# Patient Record
Sex: FEMALE | Race: WHITE | NOT HISPANIC OR LATINO | Employment: UNEMPLOYED | URBAN - NONMETROPOLITAN AREA
[De-identification: names, ages, dates, MRNs, and addresses within clinical notes are randomized per-mention and may not be internally consistent; named-entity substitution may affect disease eponyms.]

---

## 2023-05-04 ENCOUNTER — OFFICE VISIT (OUTPATIENT)
Dept: PRIMARY CARE | Facility: CLINIC | Age: 28
End: 2023-05-04
Payer: COMMERCIAL

## 2023-05-04 VITALS
WEIGHT: 175 LBS | SYSTOLIC BLOOD PRESSURE: 118 MMHG | DIASTOLIC BLOOD PRESSURE: 76 MMHG | OXYGEN SATURATION: 99 % | HEART RATE: 80 BPM | BODY MASS INDEX: 31.01 KG/M2 | HEIGHT: 63 IN

## 2023-05-04 DIAGNOSIS — M79.7 FIBROMYALGIA: Primary | ICD-10-CM

## 2023-05-04 PROCEDURE — 99213 OFFICE O/P EST LOW 20 MIN: CPT | Performed by: FAMILY MEDICINE

## 2023-05-04 PROCEDURE — 1036F TOBACCO NON-USER: CPT | Performed by: FAMILY MEDICINE

## 2023-05-04 RX ORDER — CHOLECALCIFEROL (VITAMIN D3) 125 MCG
125 CAPSULE ORAL DAILY
COMMUNITY
End: 2023-12-11 | Stop reason: WASHOUT

## 2023-05-04 RX ORDER — RIBOFLAVIN (VITAMIN B2) 50 MG
1 TABLET ORAL DAILY
COMMUNITY
End: 2023-12-11 | Stop reason: WASHOUT

## 2023-05-04 RX ORDER — FERROUS SULFATE 325(65) MG
65 TABLET ORAL
COMMUNITY
End: 2023-12-11 | Stop reason: WASHOUT

## 2023-05-04 RX ORDER — VITAMIN E MIXED 400 UNIT
400 CAPSULE ORAL DAILY
COMMUNITY
End: 2023-12-11 | Stop reason: WASHOUT

## 2023-05-04 RX ORDER — ZINC GLUCONATE 50 MG
1 TABLET ORAL DAILY
COMMUNITY
End: 2023-12-11 | Stop reason: WASHOUT

## 2023-05-04 RX ORDER — ASCORBIC ACID 500 MG
500 TABLET ORAL DAILY
COMMUNITY
End: 2023-12-11 | Stop reason: WASHOUT

## 2023-05-04 ASSESSMENT — PATIENT HEALTH QUESTIONNAIRE - PHQ9
SUM OF ALL RESPONSES TO PHQ9 QUESTIONS 1 AND 2: 0
1. LITTLE INTEREST OR PLEASURE IN DOING THINGS: NOT AT ALL
2. FEELING DOWN, DEPRESSED OR HOPELESS: NOT AT ALL

## 2023-05-04 NOTE — PROGRESS NOTES
"Subjective   Patient ID: Janna Alvarado is a 27 y.o. female who presents for Fibromyalgia.    HPI 10 yrs +  Dx Fibromyalgia   Patient was diagnosed [facility] years ago  She does have pain in multiple areas/trigger points  Longstanding fatigue  She does feel less depressed than she did but had depression in the past  Test have been negative for other forms of inflammatory arthritis such as rheumatoid arthritis she stated she is uncertain whether she was ruled out for lupus  The past few months she has had a malar rash  She has had 2 children without miscarriages and full-term  No problems with blood pressure during pregnancy    Review of Systems    Objective   /76   Pulse 80   Ht 1.588 m (5' 2.5\")   Wt 79.4 kg (175 lb)   SpO2 99%   BMI 31.50 kg/m²     Physical Exam  General: alert, no apparent distress, good hygiene   HEAD:  Normocephalic, atraumatic    EARS:  EAC patent, TMs normal,   EYES:  sclera white, DM, conjunctiva noninjected  NOSE: Nasal passages patent   MOUTH: Pharynx clear, tongue uvula midline, grade 2 airway  Neck:  supple, no masses, thyroid non enlarged non nodular, no cervical adenopathy,  Lungs:  no wheezing, no rales , no rhonchi, normal respiratory pattern, breath sounds not diminished  Heart:  regular rate and  rhythm, no murmur, no ectopy, no S3 or S4, no carotid bruits  Abdomen:  soft NT,BS + ,  no organomegaly, no masses, no bruits  Extremities:  no edema, no cyanosis, no clubbing,  2+ posterior tibialis pulse    Psych:  speech fluent, normal affect, normal thought process  Skin:  no rashes, no concerning skin lesions, normal texture  Neuro:  normal gait      Assessment/Plan   Problem List Items Addressed This Visit       Fibromyalgia - Primary        Patient ID: Janna Alvarado is a 27 y.o. female.  Patient completed order form for FM/a test she is aware to send off test may cost over thousand dollars   Offered patient testing for lupus as she does have malar rash " although felt unlikely since she had normal pregnancies no miscarriages denies photosensitivity of the sun.  Procedures

## 2023-05-05 PROBLEM — M79.7 FIBROMYALGIA: Status: ACTIVE | Noted: 2023-05-05

## 2023-05-15 ENCOUNTER — TELEPHONE (OUTPATIENT)
Dept: PRIMARY CARE | Facility: CLINIC | Age: 28
End: 2023-05-15
Payer: COMMERCIAL

## 2023-05-18 NOTE — TELEPHONE ENCOUNTER
Pt called about the testing. I did tell her that we are still working on finding that out. She said that is fine. Her call back number is [phone].

## 2023-05-30 ENCOUNTER — TELEPHONE (OUTPATIENT)
Dept: PRIMARY CARE | Facility: CLINIC | Age: 28
End: 2023-05-30
Payer: COMMERCIAL

## 2023-12-11 ENCOUNTER — OFFICE VISIT (OUTPATIENT)
Dept: PRIMARY CARE | Facility: CLINIC | Age: 28
End: 2023-12-11
Payer: COMMERCIAL

## 2023-12-11 VITALS
HEIGHT: 63 IN | DIASTOLIC BLOOD PRESSURE: 68 MMHG | WEIGHT: 172 LBS | BODY MASS INDEX: 30.48 KG/M2 | HEART RATE: 68 BPM | OXYGEN SATURATION: 99 % | SYSTOLIC BLOOD PRESSURE: 112 MMHG

## 2023-12-11 DIAGNOSIS — M79.7 FIBROMYALGIA: Primary | ICD-10-CM

## 2023-12-11 LAB
ALBUMIN SERPL BCP-MCNC: 4.4 G/DL (ref 3.4–5)
ALP SERPL-CCNC: 47 U/L (ref 33–110)
ALT SERPL W P-5'-P-CCNC: 9 U/L (ref 7–45)
ANION GAP SERPL CALC-SCNC: 14 MMOL/L (ref 10–20)
AST SERPL W P-5'-P-CCNC: 12 U/L (ref 9–39)
BASOPHILS # BLD AUTO: 0.02 X10*3/UL (ref 0–0.1)
BASOPHILS NFR BLD AUTO: 0.4 %
BILIRUB SERPL-MCNC: 0.5 MG/DL (ref 0–1.2)
BUN SERPL-MCNC: 8 MG/DL (ref 6–23)
CALCIUM SERPL-MCNC: 9.4 MG/DL (ref 8.6–10.3)
CHLORIDE SERPL-SCNC: 103 MMOL/L (ref 98–107)
CMV IGG AVIDITY SERPL IA-RTO: NONREACTIVE %
CO2 SERPL-SCNC: 28 MMOL/L (ref 21–32)
CORTIS SERPL-MCNC: 14.2 UG/DL (ref 2.5–20)
CREAT SERPL-MCNC: 0.63 MG/DL (ref 0.5–1.05)
EBV EA IGG SER QL: NEGATIVE
EBV NA AB SER QL: POSITIVE
EBV VCA IGG SER IA-ACNC: POSITIVE
EBV VCA IGM SER IA-ACNC: ABNORMAL
EOSINOPHIL # BLD AUTO: 0.1 X10*3/UL (ref 0–0.7)
EOSINOPHIL NFR BLD AUTO: 1.8 %
ERYTHROCYTE [DISTWIDTH] IN BLOOD BY AUTOMATED COUNT: 11.9 % (ref 11.5–14.5)
ERYTHROCYTE [SEDIMENTATION RATE] IN BLOOD BY WESTERGREN METHOD: 3 MM/H (ref 0–20)
FERRITIN SERPL-MCNC: 146 NG/ML (ref 8–150)
FSH SERPL-ACNC: 8.8 IU/L
GFR SERPL CREATININE-BSD FRML MDRD: >90 ML/MIN/1.73M*2
GLUCOSE SERPL-MCNC: 96 MG/DL (ref 74–99)
HCT VFR BLD AUTO: 42.3 % (ref 36–46)
HGB BLD-MCNC: 14.6 G/DL (ref 12–16)
IMM GRANULOCYTES # BLD AUTO: 0.01 X10*3/UL (ref 0–0.7)
IMM GRANULOCYTES NFR BLD AUTO: 0.2 % (ref 0–0.9)
INSULIN SERPL-ACNC: 6 UIU/ML (ref 3–25)
IRON SATN MFR SERPL: 30 % (ref 25–45)
IRON SERPL-MCNC: 114 UG/DL (ref 35–150)
LH SERPL-ACNC: 6.4 IU/L
LYMPHOCYTES # BLD AUTO: 1.94 X10*3/UL (ref 1.2–4.8)
LYMPHOCYTES NFR BLD AUTO: 35.5 %
MAGNESIUM SERPL-MCNC: 1.87 MG/DL (ref 1.6–2.4)
MCH RBC QN AUTO: 31.8 PG (ref 26–34)
MCHC RBC AUTO-ENTMCNC: 34.5 G/DL (ref 32–36)
MCV RBC AUTO: 92 FL (ref 80–100)
MONOCYTES # BLD AUTO: 0.31 X10*3/UL (ref 0.1–1)
MONOCYTES NFR BLD AUTO: 5.7 %
NEUTROPHILS # BLD AUTO: 3.09 X10*3/UL (ref 1.2–7.7)
NEUTROPHILS NFR BLD AUTO: 56.4 %
NRBC BLD-RTO: 0 /100 WBCS (ref 0–0)
PLATELET # BLD AUTO: 302 X10*3/UL (ref 150–450)
POC HEMOGLOBIN A1C: 5.1 % (ref 4.2–6.5)
POTASSIUM SERPL-SCNC: 4.5 MMOL/L (ref 3.5–5.3)
PROT SERPL-MCNC: 7.1 G/DL (ref 6.4–8.2)
RBC # BLD AUTO: 4.59 X10*6/UL (ref 4–5.2)
SODIUM SERPL-SCNC: 140 MMOL/L (ref 136–145)
T3FREE SERPL-MCNC: 3.7 PG/ML (ref 2.3–4.2)
T4 FREE SERPL-MCNC: 0.95 NG/DL (ref 0.61–1.12)
THYROPEROXIDASE AB SERPL-ACNC: <28 IU/ML
TIBC SERPL-MCNC: 377 UG/DL (ref 240–445)
TSH SERPL-ACNC: 1.87 MIU/L (ref 0.44–3.98)
TTG IGG SER IA-ACNC: <1 U/ML
UIBC SERPL-MCNC: 263 UG/DL (ref 110–370)
VIT B12 SERPL-MCNC: 427 PG/ML (ref 211–911)
WBC # BLD AUTO: 5.5 X10*3/UL (ref 4.4–11.3)

## 2023-12-11 PROCEDURE — 84402 ASSAY OF FREE TESTOSTERONE: CPT

## 2023-12-11 PROCEDURE — 82626 DEHYDROEPIANDROSTERONE: CPT

## 2023-12-11 PROCEDURE — 83036 HEMOGLOBIN GLYCOSYLATED A1C: CPT | Performed by: FAMILY MEDICINE

## 2023-12-11 PROCEDURE — 86665 EPSTEIN-BARR CAPSID VCA: CPT

## 2023-12-11 PROCEDURE — 83525 ASSAY OF INSULIN: CPT

## 2023-12-11 PROCEDURE — 83550 IRON BINDING TEST: CPT

## 2023-12-11 PROCEDURE — 82785 ASSAY OF IGE: CPT

## 2023-12-11 PROCEDURE — 86376 MICROSOMAL ANTIBODY EACH: CPT

## 2023-12-11 PROCEDURE — 86644 CMV ANTIBODY: CPT

## 2023-12-11 PROCEDURE — 84305 ASSAY OF SOMATOMEDIN: CPT

## 2023-12-11 PROCEDURE — 85652 RBC SED RATE AUTOMATED: CPT

## 2023-12-11 PROCEDURE — 36415 COLL VENOUS BLD VENIPUNCTURE: CPT

## 2023-12-11 PROCEDURE — 82533 TOTAL CORTISOL: CPT

## 2023-12-11 PROCEDURE — 99213 OFFICE O/P EST LOW 20 MIN: CPT | Performed by: FAMILY MEDICINE

## 2023-12-11 PROCEDURE — 83540 ASSAY OF IRON: CPT

## 2023-12-11 PROCEDURE — 86790 VIRUS ANTIBODY NOS: CPT

## 2023-12-11 PROCEDURE — 84140 ASSAY OF PREGNENOLONE: CPT

## 2023-12-11 PROCEDURE — 84439 ASSAY OF FREE THYROXINE: CPT

## 2023-12-11 PROCEDURE — 82024 ASSAY OF ACTH: CPT

## 2023-12-11 PROCEDURE — 82607 VITAMIN B-12: CPT

## 2023-12-11 PROCEDURE — 83516 IMMUNOASSAY NONANTIBODY: CPT

## 2023-12-11 PROCEDURE — 80053 COMPREHEN METABOLIC PANEL: CPT

## 2023-12-11 PROCEDURE — 84481 FREE ASSAY (FT-3): CPT

## 2023-12-11 PROCEDURE — 83002 ASSAY OF GONADOTROPIN (LH): CPT

## 2023-12-11 PROCEDURE — 84443 ASSAY THYROID STIM HORMONE: CPT

## 2023-12-11 PROCEDURE — 83001 ASSAY OF GONADOTROPIN (FSH): CPT

## 2023-12-11 PROCEDURE — 85025 COMPLETE CBC W/AUTO DIFF WBC: CPT

## 2023-12-11 PROCEDURE — 84482 T3 REVERSE: CPT

## 2023-12-11 PROCEDURE — 82728 ASSAY OF FERRITIN: CPT

## 2023-12-11 PROCEDURE — 83735 ASSAY OF MAGNESIUM: CPT

## 2023-12-11 PROCEDURE — 86663 EPSTEIN-BARR ANTIBODY: CPT

## 2023-12-11 PROCEDURE — 86664 EPSTEIN-BARR NUCLEAR ANTIGEN: CPT

## 2023-12-11 ASSESSMENT — PATIENT HEALTH QUESTIONNAIRE - PHQ9
1. LITTLE INTEREST OR PLEASURE IN DOING THINGS: NOT AT ALL
SUM OF ALL RESPONSES TO PHQ9 QUESTIONS 1 AND 2: 0
2. FEELING DOWN, DEPRESSED OR HOPELESS: NOT AT ALL

## 2023-12-11 NOTE — PROGRESS NOTES
"Subjective   Patient ID: Janna Alvarado is a 28 y.o. female who presents for Fibromyalgia.    HPI   8 yrs fibro / fatigue  Patient is going through a consult with someone once lab work performed  Provide initiate with extremely extensive lab work  Patient is basically self-pay understands cost  She denies significant change in symptoms  Menstrual periods regular  Has had 2 children  Never had manage Panel    Review of Systems    Objective   /68   Pulse 68   Ht 1.588 m (5' 2.5\")   Wt 78 kg (172 lb)   SpO2 99%   BMI 30.96 kg/m²     Physical Exam  General: alert, no apparent distress, good hygiene   HEAD:  Normocephalic, atraumatic    EARS:  EAC patent, TMs normal,   EYES:  sclera white, DM, conjunctiva noninjected  NOSE: Nasal passages patent   MOUTH: Pharynx clear, tongue uvula midline, grade 2 airway  Neck:  supple, no masses, thyroid non enlarged non nodular, no cervical adenopathy,  Lungs:  no wheezing, no rales , no rhonchi, normal respiratory pattern, breath sounds not diminished  Heart:  regular rate and  rhythm, no murmur, no ectopy, no S3 or S4, no carotid bruits  Abdomen:  soft NT,BS + ,  no organomegaly, no masses, no bruits    Assessment/Plan   Problem List Items Addressed This Visit             ICD-10-CM    Fibromyalgia - Primary M79.7    Relevant Orders    Comprehensive Metabolic Panel (Completed)    Magnesium (Completed)    CBC and Auto Differential (Completed)    Sedimentation Rate (Completed)    Ferritin (Completed)    Iron and TIBC (Completed)    POCT glycosylated hemoglobin (Hb A1C) manually resulted (Completed)    Vitamin B12 (Completed)    Thyroid Stimulating Hormone (Completed)    Thyroxine, Free (Completed)    T3, free (Completed)    T3, Reverse    Pregnenolone    DHEA level    Cortisol (Completed)    ACTH    IgE    Testosterone, total and free    Insulin, random (Completed)    Insulin like growth factor    FSH (Completed)    Luteinizing hormone (Completed)    Thyroid Peroxidase " (TPO) Antibody (Completed)    Tissue Transglutaminase IgG    HHV-6 Antibody,IgG    Cytomegalovirus antibody, IgG (Completed)    Raghavendra-Barr virus VCA antibody panel   If labs unremarkable discussed possibly ordering [facility] CHEM panel for patient.

## 2023-12-12 LAB — IGE SERPL-ACNC: 2 IU/ML (ref 0–214)

## 2023-12-13 LAB — ACTH PLAS-MCNC: 9.8 PG/ML (ref 7.2–63.3)

## 2023-12-14 LAB
EBV VCA IGM SER-ACNC: <10 U/ML (ref 0–43.9)
IGF-I SERPL-MCNC: 150 NG/ML (ref 96–301)
IGF-I Z-SCORE SERPL: -0.7
T3REVERSE SERPL-MCNC: 12.9 NG/DL (ref 9–27)

## 2023-12-16 LAB
TESTOSTERONE FREE (CHAN): 2 PG/ML (ref 0.1–6.4)
TESTOSTERONE,TOTAL,LC-MS/MS: 27 NG/DL (ref 2–45)

## 2023-12-17 LAB — PREG SERPL-MCNC: 137 NG/DL (ref 15–132)

## 2023-12-21 LAB — DHEA SERPL-MCNC: 2.27 NG/ML (ref 1.33–7.78)

## 2023-12-26 LAB — HHV6 IGG TITR SER IF: ABNORMAL {TITER}

## 2025-03-11 ENCOUNTER — APPOINTMENT (OUTPATIENT)
Facility: CLINIC | Age: 30
End: 2025-03-11
Payer: COMMERCIAL

## 2025-03-17 PROBLEM — Z3A.00 WEEKS OF GESTATION OF PREGNANCY NOT SPECIFIED (HHS-HCC): Status: ACTIVE | Noted: 2025-03-17

## 2025-03-19 ENCOUNTER — APPOINTMENT (OUTPATIENT)
Dept: LAB | Facility: HOSPITAL | Age: 30
End: 2025-03-19
Payer: COMMERCIAL

## 2025-03-19 ENCOUNTER — APPOINTMENT (OUTPATIENT)
Facility: CLINIC | Age: 30
End: 2025-03-19
Payer: COMMERCIAL

## 2025-03-19 VITALS — WEIGHT: 206 LBS | BODY MASS INDEX: 37.08 KG/M2 | DIASTOLIC BLOOD PRESSURE: 78 MMHG | SYSTOLIC BLOOD PRESSURE: 120 MMHG

## 2025-03-19 DIAGNOSIS — Z34.92 PRENATAL CARE IN SECOND TRIMESTER (HHS-HCC): ICD-10-CM

## 2025-03-19 DIAGNOSIS — Z3A.21 21 WEEKS GESTATION OF PREGNANCY (HHS-HCC): ICD-10-CM

## 2025-03-19 DIAGNOSIS — M79.7 FIBROMYALGIA: Primary | ICD-10-CM

## 2025-03-19 PROBLEM — O34.219 PREVIOUS CESAREAN SECTION COMPLICATING PREGNANCY (HHS-HCC): Status: ACTIVE | Noted: 2025-03-19

## 2025-03-19 LAB
ABDOMINAL CIRCUMFERENCE: 171.2 MM
ABO GROUP (TYPE) IN BLOOD: NORMAL
BIPARIETAL DIAMETER: 49.2 MM
ERYTHROCYTE [DISTWIDTH] IN BLOOD BY AUTOMATED COUNT: 13.2 % (ref 11.5–14.5)
FEMUR LENGTH: 37.9 MM
HCT VFR BLD AUTO: 39.3 % (ref 36–46)
HEAD CIRCUMFERENCE: 194.6 MM
HGB BLD-MCNC: 13.5 G/DL (ref 12–16)
MCH RBC QN AUTO: 32.3 PG (ref 26–34)
MCHC RBC AUTO-ENTMCNC: 34.4 G/DL (ref 32–36)
MCV RBC AUTO: 94 FL (ref 80–100)
NRBC BLD-RTO: 0 /100 WBCS (ref 0–0)
PLATELET # BLD AUTO: 262 X10*3/UL (ref 150–450)
RBC # BLD AUTO: 4.18 X10*6/UL (ref 4–5.2)
RH FACTOR (ANTIGEN D): NORMAL
WBC # BLD AUTO: 9.2 X10*3/UL (ref 4.4–11.3)

## 2025-03-19 PROCEDURE — 85027 COMPLETE CBC AUTOMATED: CPT

## 2025-03-19 PROCEDURE — 0500F INITIAL PRENATAL CARE VISIT: CPT | Performed by: OBSTETRICS & GYNECOLOGY

## 2025-03-19 PROCEDURE — 76815 OB US LIMITED FETUS(S): CPT | Performed by: OBSTETRICS & GYNECOLOGY

## 2025-03-19 PROCEDURE — 86900 BLOOD TYPING SEROLOGIC ABO: CPT

## 2025-03-19 PROCEDURE — 86901 BLOOD TYPING SEROLOGIC RH(D): CPT

## 2025-03-19 ASSESSMENT — EDINBURGH POSTNATAL DEPRESSION SCALE (EPDS)
I HAVE FELT SCARED OR PANICKY FOR NO GOOD REASON: NO, NOT AT ALL
I HAVE FELT SAD OR MISERABLE: NO, NOT AT ALL
I HAVE BEEN ANXIOUS OR WORRIED FOR NO GOOD REASON: NO, NOT AT ALL
THE THOUGHT OF HARMING MYSELF HAS OCCURRED TO ME: NEVER
TOTAL SCORE: 0
I HAVE BEEN SO UNHAPPY THAT I HAVE BEEN CRYING: NO, NEVER
THINGS HAVE BEEN GETTING ON TOP OF ME: NO, I HAVE BEEN COPING AS WELL AS EVER
I HAVE BEEN ABLE TO LAUGH AND SEE THE FUNNY SIDE OF THINGS: AS MUCH AS I ALWAYS COULD
I HAVE LOOKED FORWARD WITH ENJOYMENT TO THINGS: AS MUCH AS I EVER DID
I HAVE BEEN SO UNHAPPY THAT I HAVE HAD DIFFICULTY SLEEPING: NOT AT ALL
I HAVE BLAMED MYSELF UNNECESSARILY WHEN THINGS WENT WRONG: NO, NEVER

## 2025-03-19 ASSESSMENT — COLUMBIA-SUICIDE SEVERITY RATING SCALE - C-SSRS
1. IN THE PAST MONTH, HAVE YOU WISHED YOU WERE DEAD OR WISHED YOU COULD GO TO SLEEP AND NOT WAKE UP?: NO
6. HAVE YOU EVER DONE ANYTHING, STARTED TO DO ANYTHING, OR PREPARED TO DO ANYTHING TO END YOUR LIFE?: NO
2. HAVE YOU ACTUALLY HAD ANY THOUGHTS OF KILLING YOURSELF?: NO

## 2025-03-19 NOTE — PROGRESS NOTES
Healthy, no meds  Late presentation to care-21w  6yo boy (LTCS, fetal intolerance), 2 yo girl ()  No prior GDM, HDP, or PPH  LMP certain 10/19/2024  BASA  Rh neg      Subjective   Janna Alvarado is a 29 y.o.  at Unknown with a working estimated date of delivery of Not found. who presents for an initial prenatal visit. This pregnancy is planned.    Patient currently experiencing: nausea, declines anti-emetics  Bleeding or cramping since LMP: no  Taking prenatal vitamin: Yes  Ultrasound completed this pregnancy: No    Last pap: due, will get at [facility]    OB History    Para Term  AB Living   3 2 2     2   SAB IAB Ectopic Multiple Live Births           2      # Outcome Date GA Lbr Braxton/2nd Weight Sex Type Anes PTL Lv   3 Current            2 Term 22 40w0d  3.912 kg F  EPI  EMILY   1 Term 20 39w0d  3.033 kg M CS-LTranv   EMILY      Complications: Fetal Intolerance     [place]  Depression Scale Total: 0  Prior pregnancy complications:  FETAL INTOLERANCE TO LABOR    History of hypertension:  No    History reviewed. No pertinent past medical history.   Past Surgical History:   Procedure Laterality Date    APPENDECTOMY       SECTION, LOW TRANSVERSE        Social History     Socioeconomic History    Marital status:      Spouse name: Poli    Number of children: 2   Tobacco Use    Smoking status: Former     Types: Cigarettes    Smokeless tobacco: Never   Vaping Use    Vaping status: Former    Substances: Flavoring    Devices: Disposable   Substance and Sexual Activity    Alcohol use: Never    Drug use: Never    Sexual activity: Yes     Partners: Male     Birth control/protection: None        Objective   Weight: 93.4 kg (206 lb)  Expected Total Weight Gain: 5 kg (11 lb)-9 kg (19 lb)   Pregravid BMI: 32.38  BP: 120/78  Fetal Heart Rate: 154 Fundal Height (cm): 22 cm Presentation: Breech           ROS  Constitutional: Negative.    HENT: Negative.     Eyes: Negative.     Respiratory: Negative.     Cardiovascular: Negative.    Gastrointestinal: Negative.    Endocrine: Negative.    Genitourinary: Negative.    Musculoskeletal: Negative.    Skin: Negative.    Allergic/Immunologic: Negative.    Neurological: Negative.    Hematological: Negative.    Psychiatric/Behavioral: Negative.         Physical Exam  Constitutional:       General: She is not in acute distress.     Appearance: Normal appearance.   Pulmonary:      Effort: Pulmonary effort is normal.   Abdominal:      General: Bowel sounds are normal.      Palpations: Abdomen is soft.   Musculoskeletal:         General: Normal range of motion.   Neurological:      Mental Status: She is alert.   Psychiatric:         Mood and Affect: Mood normal.   Pelvic:   Normal external genitalia, no lesions.  Normal vaginal discharge.  Cervix closed with normal cervical length. Uterus 22 wk size,     ULTRASOUND:  single viable intrauterine pregnancy with noted cardiac activity and FHR of 154  Comp. US GS: 21w6d  EDC:2025  Consistent with LMP dating    Postpartum Depression: Low Risk  (3/19/2025)    [place]  Depression Scale     Last EPDS Total Score: 0     Last EPDS Self Harm Result: Never          Assessment   Janna Alvarado is a 29 y.o.  at Unknown with a working estimated date of delivery of Not found. who presents for an initial prenatal visit.   She was oriented to the practice and on call coverage system.  Healthy diet, exercise and weight gain expectations in pregnancy were reviewed.  She was counseled regarding unsafe exposures.  She was counseled accordingly for any risk factors identified.    Problem List Items Addressed This Visit             ICD-10-CM    Fibromyalgia - Primary M79.7    21 weeks gestation of pregnancy ([facility]) Z3A.21     Other Visit Diagnoses         Codes    Prenatal care in second trimester ([facility])     Z34.92    Relevant Orders    C. trachomatis / N. gonorrhoeae, Amplified, Urogenital     Urine Culture    Abo/Rh (Completed)    CBC Anemia Panel With Reflex,Pregnancy    Hemoglobin A1C    Hepatitis B Surface Antigen    Syphilis Screen with Reflex    Rubella Antibody, IgG    HIV 1/2 Antigen/Antibody Screen with Reflex to Confirmation            Plan   - New OB resources provided and reviewed with particular attention to dietary, travel, and medication restrictions  - Oriented to practice, CNM vs. MD care  - Reviewed bleeding precautions, warning signs, when to call provider; phone number provided  - Routine NOB labs ordered  - Return in 4 weeks for routine prenatal care  Daniel Henderson MD    Education provided:   Avoidance of alcohol, tobacco and drug use     Dietary restrictions reviewed including avoiding raw or poorly cooked meat, lunch meat and soft cheeses    3.    Adequate water intake.  Avoid empty calories with juices    4.    Recommendation for weight gain are based on initial BMI (body mass index) but hope is for 25 lbs or less.    5.    Limit caffeine to less than 200-300 mg/day    6.    Take folic acid 400 mcg daily.  Incorporate 5,000u Vitamin D3 per day.  Magnesium supplementation with Ultra Mag           is great for headaches, muscle aches, constipation and sleep    7.    Importance of good sleep hygiene and avoidance of laying on back after 15 weeks    8.    Encourage daily physical activity of 30 minutes a day the majority of the days of the week.  There is a great program         for pregnant and postpartum mom's called [id number]    9.    Discussed normal physiologic changes:  Round ligament pain, nausea, breast tenderness    10.  Discussed natural remedies, vitamins and prescription medications for nausea    11.  Baby aspirin 162mg daily (two baby aspirin) for the reduction of pre-eclampsia during pregnancy.  This is now recommended for all pregnancies because outcomes have noted to be better. Even if you have never had any blood pressure issues, you can develop hypertension  during your pregnancy.  This has been well studied and safe to take starting at 10 weeks gestation until after the birth of your baby.    **IF AT ANYTIME DURING YOUR PREGNANCY YOU HAVE CONCERNS THAT YOU CANNOT AFFORD HEALTHY FOOD PLEASE LET US KNOW!**  We have a Food for Life program and would be happy to place a referral for you.  It is so important to eat healthy during your pregnancy and we treat food as medicine.  Healthy food is expensive!  This program will allow you and your family up to 4 to receive food and recipes for one week per month.  This needs to be renewed every 6 months.    Ultrasound and screening for aneuploidies (Down Syndrome/Trisomy 21, Trisomy 13 + 18)  There are two ultrasounds that are recommended during your pregnancy.  A nuchal translucency is done between 11-13 weeks and is checking for any structural defects that are obvious at this early gestation.  An anatomy scan will be done at approximately 19-20 weeks to look at all structures.  An order has been placed in [facility] to get these scheduled.    Based on risk factors and any concerns the maternal fetal medicine provider has, you may need a repeat ultrasound.  Healthy pregnancies that do not have any other concerns by the midwife or maternal fetal medicine do not have any repeat ultrasounds done.    Cell free DNA is a test that can be done at 10 weeks by a blood sample taken from you that can assess the baby to be low or high risk for trisomy 21 (Down Syndrome), and trisomy 13 + 18.  This test will also know the fetal sex only if you want to know.    Labs:   An order will be placed for your new ob labs.  Please get those done at the time of your ultrasound.  They will collect multiple tubes of blood for new ob labs and also urine for STI testing          and a urine culture.   If there are any concerns with any blood work or urine testing WE WILL CALL YOU OR COMMUNICATE VIA Melon #[email or web address]!!!   The biggest concerns our patients have is when they  see their complete blood count.  The reference range in the result is for a non pregnant person!  We will notify you if there is any need to start an iron supplement.  3.    At 26-28 weeks a glucose test is ordered to see if you have gestational diabetes.  We also reassess if you have anemia, which can be common in pregnancy  4.    Group B strep culture will be done at 36 weeks gestation.      How frequently will you come for your prenatal appointments?  We will see you in the office every 4 weeks until you are 30 weeks, then every 2 weeks until 36 weeks and then weekly until your baby is born.    To call for questions regarding your care of if you are in labor is [phone]  After hours, the answering service will ask you if you are in labor or if this is an emergency that can not wait until the office is open to be connected to a representative that will take your message and forward it to the provider that is on call.    [email or web address] Messages     If you would like to tour [facility]'s Maternity [place]:  Please call the Childbirth education line at [phone]    Danger signs to report:  Seek medical care immediately if you have pain that is doubling you over or vaginal bleeding that is heavier than a  period  Notify the office should you have any burning, urgency, frequency of urination or other concerning symptoms.    Medications that are safe for common discomforts of pregnancy:   Tylenol   Tums or Papaya extract for any upset stomach or heartburn   Zyrtec, Claritin, Benadryl for allergy symptoms   Sudafed or Robitussin for cold symptoms  [employer] is an jan that is great for what medications are safe to take throughout your pregnancy and breastfeeding journey through the first year of life!  Well worth the $3.99    Work restrictions:  A normal healthy pregnancy without any complications are able to have the standard pregnancy work restrictions which is no pushing/pulling/lifting greater than 25 pounds  "independently    FMLA paperwork  Can be brought to the office for us to fill out for when you are starting your maternity leave (either your scheduled date of going to the hospital or your due date).  We cannot give out early FMLA when there is no documented medical conditions that are considered \"normal pregnancy\" events.    Comfort measures   Chiropractors that are [employer] Certified are great for alleviation of ligament pain   Yoga is good for your ligaments and mentally preparing for baby and labor.  A prenatal yoga class is recommended.  3.     Prenatal massages are fine  4.     A maternity support belt is an amazing thing that can help ligament pain -- can be purchased on Amazon  5.     Good supportive shoes are key to helping with ligament pain    Dental care  It is very important to see a dentist during your pregnancy for routine cleaning and also if you develop any dental pain during your pregnancy.  Healthy gums and teeth are very important during your pregnancy.  We can provide you with a dental letter if your dentist would like one.    Thank you for choosing our practice and [facility] for you healthcare!  I am excited to partner with you during this very special time!     If there is anything I can do to help make your experience positive, please come to your visits with questions and concerns and do not be afraid to ask what is on your mind!    Until then, be well!                                                                                                         [facility]  [facility]  [address] [place]  [address] [phone]    [facility] [place] [address]  [place]  [address] [phone]    [facility]  [address]  [address]  [place] [address] [phone]    [facility]  [address]  [address]  [place], [place]  " [address]  [phone]    [facility]  [address] [place] [address]  [place] [id number]  [phone]    [facility]  [address]  [place], [place]  [id number] [phone]

## 2025-03-20 LAB — REFLEX ADDED, ANEMIA PANEL: NORMAL

## 2025-03-21 LAB
BACTERIA UR CULT: NORMAL
C TRACH RRNA SPEC QL NAA+PROBE: NOT DETECTED
N GONORRHOEA RRNA SPEC QL NAA+PROBE: NOT DETECTED
QUEST GC CT AMPLIFIED (ALWAYS MESSAGE): NORMAL

## 2025-03-23 LAB
EST. AVERAGE GLUCOSE BLD GHB EST-MCNC: 94 MG/DL
EST. AVERAGE GLUCOSE BLD GHB EST-SCNC: 5.2 MMOL/L
HBA1C MFR BLD: 4.9 % OF TOTAL HGB
HBV SURFACE AG SERPL QL IA: NORMAL
HIV 1+2 AB+HIV1 P24 AG SERPL QL IA: NORMAL
RUBV IGG SERPL IA-ACNC: 4.16 INDEX
T PALLIDUM AB SER QL IA: NEGATIVE

## 2025-04-15 PROBLEM — Z3A.25 25 WEEKS GESTATION OF PREGNANCY (HHS-HCC): Status: ACTIVE | Noted: 2025-03-17

## 2025-04-16 ENCOUNTER — APPOINTMENT (OUTPATIENT)
Facility: CLINIC | Age: 30
End: 2025-04-16
Payer: COMMERCIAL

## 2025-04-16 VITALS — DIASTOLIC BLOOD PRESSURE: 72 MMHG | WEIGHT: 215 LBS | SYSTOLIC BLOOD PRESSURE: 116 MMHG | BODY MASS INDEX: 38.7 KG/M2

## 2025-04-16 DIAGNOSIS — Z3A.25 25 WEEKS GESTATION OF PREGNANCY (HHS-HCC): Primary | ICD-10-CM

## 2025-04-16 DIAGNOSIS — M79.7 FIBROMYALGIA: ICD-10-CM

## 2025-04-16 DIAGNOSIS — O34.219 PREVIOUS CESAREAN SECTION COMPLICATING PREGNANCY (HHS-HCC): ICD-10-CM

## 2025-04-16 PROCEDURE — 0501F PRENATAL FLOW SHEET: CPT | Performed by: OBSTETRICS & GYNECOLOGY

## 2025-04-16 NOTE — PROGRESS NOTES
Ob Visit  25     Objective   Physical Exam:   Weight: 97.5 kg (215 lb)  Pregravid BMI: 32.38  BP: 116/72  Fetal Heart Rate: 140 Fundal Height (cm): 26 cm Presentation: Vertex           ASSESSMENT   Janna Alvarado is a 29 y.o.  at 25w3d with a working estimated date of delivery of 2025, by Last Menstrual Period who presents for a routine prenatal visit.    The following concerns we addressed today:  DISCUSSED TDAP  BW REVIEWED    PLAN  TDAP declined  Gtt next visit  RHOGAM NEXT VISIT  -RTC in 4 weeks    Problem List Items Addressed This Visit       Fibromyalgia    25 weeks gestation of pregnancy ([facility]) - Primary    Overview   Healthy, no meds  6yo boy (LTCS, fetal intolerance), 2 yo girl ()  No prior GDM, HDP, or PPH  LMP certain 10/19/2024  Late presentation to care, 21 wks.  Declines level 2 and genetic screening      Desired provider in labor: [] CNM  [] Physician   [x] Either Acceptable  [x] Blood Products: [x] Yes, accepts [] No, needs counseling  [x] Initial BMI: Could not be calculated   [x] Prenatal Labs: [x]ORDERED  [x]REVIEWED  [x] Cervical Cancer Screening up to date due, late presentation to care, will get PP  [x] Rh status: O NEG  [x] Screen for IPV and Substance Use Risk: FORMER tobacco  [x] Genetic Screening (cfDNA):  DECLINED  [x] First Trimester Anatomy Screen (11-13.6 wks): DECLINED  [x] Baby ASA (initiated): DISCUSSED  [x] Pregnancy dated by:  LMP AND 21 WK US    [x] Anatomy US: (19-20 wks) DECLINED, Level 1: ALIZA, anterior placenta, no previa, 3V, Girl  [x] Federal Sterilization consent signed (if indicated): N/A  [x] 1hr GCT at 24-28wks:  [x] Rhogam (if indicated): NEEDED,   [] Fetal Surveillance (if indicated):  [] Tdap (27-32 wks, may be given up to 36 wks if initial window missed):   [] RSV (32-36 wks) (Sept. to end of ):     [] Feeding Intentions:  [] Postpartum Birth control method:   [] GBS at 36 - 37 wks:  [] 39 weeks discussion of IOL vs. Expectant  management:  [x] Mode of delivery ( anticipated ): TOLAC 88.3% MFMU score           Relevant Orders    CBC Anemia Panel With Reflex,Pregnancy    Glucose, 1 Hour Screen, Pregnancy    Type And Screen Is this order related to pregnancy or an upcoming surgery? No    Previous  section complicating pregnancy ([facility])    Overview   LTCS for fetal intolerance in labor   x 1  MFMU 83%             Daniel Henderson MD

## 2025-05-19 PROBLEM — O26.899 RH NEGATIVE STATE IN ANTEPARTUM PERIOD (HHS-HCC): Status: ACTIVE | Noted: 2025-05-19

## 2025-05-19 PROBLEM — Z67.91 RH NEGATIVE STATE IN ANTEPARTUM PERIOD (HHS-HCC): Status: ACTIVE | Noted: 2025-05-19

## 2025-05-19 PROBLEM — O09.33 INSUFFICIENT PRENATAL CARE IN THIRD TRIMESTER (HHS-HCC): Status: ACTIVE | Noted: 2025-05-19

## 2025-05-19 PROBLEM — Z3A.30 30 WEEKS GESTATION OF PREGNANCY (HHS-HCC): Status: ACTIVE | Noted: 2025-03-17

## 2025-05-19 NOTE — PROGRESS NOTES
Ob Visit  25     Objective   Physical Exam:   Weight: 98.9 kg (218 lb)  Pregravid BMI: 32.38  BP: 126/80  Fetal Heart Rate: 140 Fundal Height (cm): 31 cm Presentation: Vertex         ASSESSMENT   Janna Alvarado is a 29 y.o.  at 30w2d with a working estimated date of delivery of 2025, by Last Menstrual Period who presents for a routine prenatal visit.    The following concerns we addressed today   -fetus active, denies PTL s/s  PLAN  -rhogam today  -gtt today  -discussed tdap  -RTC in 2 weeks    Problem List Items Addressed This Visit       30 weeks gestation of pregnancy ([facility])    Overview   Healthy, no meds  4yo boy (LTCS, fetal intolerance), 2 yo girl ()  No prior GDM, HDP, or PPH  LMP certain 10/19/2024  Late presentation to care, 21 wks.  Declines level 2 and genetic screening      Desired provider in labor: [] CNM  [] Physician   [x] Either Acceptable  [x] Blood Products: [x] Yes, accepts [] No, needs counseling  [x] Initial BMI: Could not be calculated   [x] Prenatal Labs: [x]ORDERED  [x]REVIEWED  [x] Cervical Cancer Screening up to date due, late presentation to care, will get PP  [x] Rh status: O NEG  [x] Screen for IPV and Substance Use Risk: FORMER tobacco  [x] Genetic Screening (cfDNA):  DECLINED  [x] First Trimester Anatomy Screen (11-13.6 wks): DECLINED  [x] Baby ASA (initiated): DISCUSSED  [x] Pregnancy dated by:  LMP AND 21 WK US    [x] Anatomy US: (19-20 wks) DECLINED, Level 1: ALIZA, anterior placenta, no previa, 3V, Girl  [x] Federal Sterilization consent signed (if indicated): N/A  [x] 1hr GCT at 24-28wks:  [x] Rhogam (if indicated): NEEDED, rhogam 25  [] Fetal Surveillance (if indicated):  [x] Tdap (27-32 wks, may be given up to 36 wks if initial window missed): discussed  [] RSV (32-36 wks) (Sept. to end of ):     [] Feeding Intentions:  [] Postpartum Birth control method:   [] GBS at 36 - 37 wks:  [] 39 weeks discussion of IOL vs. Expectant management:  [x]  Mode of delivery ( anticipated ): TOLAC 88.3% MFMU score           Previous  section complicating pregnancy ([facility]) - Primary    Overview   LTCS for fetal intolerance in labor   x 1  MFMU 83%         Rh negative state in antepartum period ([facility])    Overview   []  Baseline antibody screen  [x]  Second trimester antibody screen  []  Rhogam given at 28 weeks           Relevant Medications    rho(D) immune globulin (Rhogam) injection 300 mcg (Start on 2025  2:00 PM)    Insufficient prenatal care in third trimester ([facility])    Overview   -late presentation to care             Daniel Henderson MD

## 2025-05-20 ENCOUNTER — APPOINTMENT (OUTPATIENT)
Dept: LAB | Facility: HOSPITAL | Age: 30
End: 2025-05-20
Payer: COMMERCIAL

## 2025-05-20 ENCOUNTER — APPOINTMENT (OUTPATIENT)
Facility: CLINIC | Age: 30
End: 2025-05-20
Payer: COMMERCIAL

## 2025-05-20 VITALS — SYSTOLIC BLOOD PRESSURE: 126 MMHG | BODY MASS INDEX: 39.24 KG/M2 | DIASTOLIC BLOOD PRESSURE: 80 MMHG | WEIGHT: 218 LBS

## 2025-05-20 DIAGNOSIS — O34.219 PREVIOUS CESAREAN SECTION COMPLICATING PREGNANCY (HHS-HCC): Primary | ICD-10-CM

## 2025-05-20 DIAGNOSIS — O09.33 INSUFFICIENT PRENATAL CARE IN THIRD TRIMESTER (HHS-HCC): ICD-10-CM

## 2025-05-20 DIAGNOSIS — Z3A.30 30 WEEKS GESTATION OF PREGNANCY (HHS-HCC): ICD-10-CM

## 2025-05-20 DIAGNOSIS — O26.899 RH NEGATIVE STATE IN ANTEPARTUM PERIOD (HHS-HCC): ICD-10-CM

## 2025-05-20 DIAGNOSIS — Z67.91 RH NEGATIVE STATE IN ANTEPARTUM PERIOD (HHS-HCC): ICD-10-CM

## 2025-05-20 LAB
ABO GROUP (TYPE) IN BLOOD: NORMAL
ANTIBODY SCREEN: NORMAL
ERYTHROCYTE [DISTWIDTH] IN BLOOD BY AUTOMATED COUNT: 12.8 % (ref 11.5–14.5)
HCT VFR BLD AUTO: 35.4 % (ref 36–46)
HGB BLD-MCNC: 12.2 G/DL (ref 12–16)
MCH RBC QN AUTO: 32.1 PG (ref 26–34)
MCHC RBC AUTO-ENTMCNC: 34.5 G/DL (ref 32–36)
MCV RBC AUTO: 93 FL (ref 80–100)
NRBC BLD-RTO: 0 /100 WBCS (ref 0–0)
PLATELET # BLD AUTO: 296 X10*3/UL (ref 150–450)
RBC # BLD AUTO: 3.8 X10*6/UL (ref 4–5.2)
RH FACTOR (ANTIGEN D): NORMAL
WBC # BLD AUTO: 9.6 X10*3/UL (ref 4.4–11.3)

## 2025-05-20 PROCEDURE — 86850 RBC ANTIBODY SCREEN: CPT

## 2025-05-20 PROCEDURE — 0501F PRENATAL FLOW SHEET: CPT | Performed by: OBSTETRICS & GYNECOLOGY

## 2025-05-20 PROCEDURE — 86900 BLOOD TYPING SEROLOGIC ABO: CPT

## 2025-05-20 PROCEDURE — 96372 THER/PROPH/DIAG INJ SC/IM: CPT | Performed by: OBSTETRICS & GYNECOLOGY

## 2025-05-20 PROCEDURE — 86901 BLOOD TYPING SEROLOGIC RH(D): CPT

## 2025-05-20 PROCEDURE — 85027 COMPLETE CBC AUTOMATED: CPT

## 2025-05-21 LAB
GLUCOSE 1H P 50 G GLC PO SERPL-MCNC: 188 MG/DL
REFLEX ADDED, ANEMIA PANEL: NORMAL

## 2025-05-23 ENCOUNTER — TELEPHONE (OUTPATIENT)
Facility: CLINIC | Age: 30
End: 2025-05-23
Payer: COMMERCIAL

## 2025-05-23 DIAGNOSIS — R73.09 ELEVATED GLUCOSE TOLERANCE TEST: ICD-10-CM

## 2025-05-23 NOTE — TELEPHONE ENCOUNTER
Called and informed pt of result. Order placed and sent to provider for 3hr.  DANITZA Littlejohn PCNA

## 2025-05-23 NOTE — TELEPHONE ENCOUNTER
----- Message from Daniel Henderson sent at 5/23/2025 12:35 PM EDT -----  Please let pt. Know her 1 hr GTT was elevated and she will need to do a 3 hr GTT  ----- Message -----  From: Lab, Background User  Sent: 5/20/2025   8:29 PM EDT  To: Daniel Henderson MD

## 2025-06-03 LAB
GLUCOSE 1H P CHAL SERPL-MCNC: 190 MG/DL
GLUCOSE 2H P CHAL SERPL-MCNC: 152 MG/DL
GLUCOSE 3H P 100 G GLC PO SERPL-MCNC: 39 MG/DL
GLUCOSE P FAST SERPL-MCNC: 77 MG/DL (ref 65–94)
SERVICE CMNT-IMP: ABNORMAL

## 2025-06-23 ENCOUNTER — PREP FOR PROCEDURE (OUTPATIENT)
Facility: CLINIC | Age: 30
End: 2025-06-23
Payer: COMMERCIAL

## 2025-06-23 PROBLEM — Z3A.35 35 WEEKS GESTATION OF PREGNANCY (HHS-HCC): Status: ACTIVE | Noted: 2025-03-17

## 2025-06-23 NOTE — PROGRESS NOTES
Ob Visit  25     Objective   Physical Exam:   Weight: 103 kg (227 lb)  Pregravid BMI: 32.38  BP: 124/84  Fetal Heart Rate: 144 Fundal Height (cm): 36 cm Presentation: Vertex  Dilation: Closed Effacement (%): 50      ASSESSMENT   Janna Alvarado is a 29 y.o.  at 35w2d with a working estimated date of delivery of 2025, by Last Menstrual Period who presents for a routine prenatal visit.    The following concerns we addressed today:  -reviewed BW, abnormal 1 hr, one elevated value on 3hr, dietary recommendations to increase protein intake  -delivery planning, desires   -PP BC  -Feeding intentions    PLAN  -TOLAC consent signed (MFMU 83%)  -GBS next visit (spont labor closer to 40wks with last)  -Consent signed today  -planning IOL closer to EDC  -RTC in 2 weeks    Problem List Items Addressed This Visit       Fibromyalgia    35 weeks gestation of pregnancy ([facility]) - Primary    Overview   Healthy, no meds  6yo boy (LTCS, fetal intolerance), 2 yo girl ()  No prior GDM, HDP, or PPH  LMP certain 10/19/2024  Late presentation to care, 21 wks.  Declines level 2 and genetic screening      Desired provider in labor: [] CNM  [] Physician   [x] Either Acceptable  [x] Blood Products: [x] Yes, accepts [] No, needs counseling  [x] Initial BMI: Could not be calculated   [x] Prenatal Labs: [x]ORDERED  [x]REVIEWED  [x] Cervical Cancer Screening up to date due, late presentation to care, will get PP  [x] Rh status: O NEG  [x] Screen for IPV and Substance Use Risk: FORMER tobacco  [x] Genetic Screening (cfDNA):  DECLINED  [x] First Trimester Anatomy Screen (11-13.6 wks): DECLINED  [x] Baby ASA (initiated): DISCUSSED  [x] Pregnancy dated by:  LMP AND 21 WK US    [x] Anatomy US: (19-20 wks) DECLINED, Level 1: ALIZA, anterior placenta, no previa, 3V, Girl  [x] Federal Sterilization consent signed (if indicated): N/A  [x] 1hr GCT at 24-28wks: 188, [x] 3hr 1/ abnormal  [x] Rhogam (if indicated): NEEDED, rhogam  25  [] Fetal Surveillance (if indicated):  [x] Tdap (27-32 wks, may be given up to 36 wks if initial window missed): discussed, declined    [x] Feeding Intentions: Bottle  [x] Postpartum Birth control method: considering IUD  [] GBS at 36 - 37 wks:  [x] 39 weeks discussion of IOL vs. Expectant management: Previous spontaneous labor at 39w5d. Planning 40 wk IOL if no spontaneous labor  [x] Mode of delivery ( anticipated ): TOLAC 88.3% MFMU score           Previous  section complicating pregnancy ([facility])    Overview   LTCS for fetal intolerance in labor   x 1  MFMU 83%         Rh negative state in antepartum period ([facility])    Overview   []  Baseline antibody screen  [x]  Second trimester antibody screen  []  Rhogam given at 28 weeks           Insufficient prenatal care in third trimester ([facility])    Overview   -late presentation to care             Daniel Henderson MD

## 2025-06-24 ENCOUNTER — APPOINTMENT (OUTPATIENT)
Facility: CLINIC | Age: 30
End: 2025-06-24
Payer: COMMERCIAL

## 2025-06-24 VITALS — WEIGHT: 227 LBS | DIASTOLIC BLOOD PRESSURE: 84 MMHG | BODY MASS INDEX: 40.86 KG/M2 | SYSTOLIC BLOOD PRESSURE: 124 MMHG

## 2025-06-24 DIAGNOSIS — Z3A.35 35 WEEKS GESTATION OF PREGNANCY (HHS-HCC): Primary | ICD-10-CM

## 2025-06-24 DIAGNOSIS — O09.33 INSUFFICIENT PRENATAL CARE IN THIRD TRIMESTER (HHS-HCC): ICD-10-CM

## 2025-06-24 DIAGNOSIS — O34.219 PREVIOUS CESAREAN SECTION COMPLICATING PREGNANCY (HHS-HCC): ICD-10-CM

## 2025-06-24 DIAGNOSIS — M79.7 FIBROMYALGIA: ICD-10-CM

## 2025-06-24 DIAGNOSIS — O26.899 RH NEGATIVE STATE IN ANTEPARTUM PERIOD (HHS-HCC): ICD-10-CM

## 2025-06-24 DIAGNOSIS — Z67.91 RH NEGATIVE STATE IN ANTEPARTUM PERIOD (HHS-HCC): ICD-10-CM

## 2025-06-24 PROCEDURE — 0501F PRENATAL FLOW SHEET: CPT | Performed by: OBSTETRICS & GYNECOLOGY

## 2025-06-25 ENCOUNTER — PREP FOR PROCEDURE (OUTPATIENT)
Facility: CLINIC | Age: 30
End: 2025-06-25
Payer: COMMERCIAL

## 2025-07-05 PROBLEM — Z3A.37 37 WEEKS GESTATION OF PREGNANCY (HHS-HCC): Status: ACTIVE | Noted: 2025-03-17

## 2025-07-05 NOTE — PROGRESS NOTES
Ob Visit  25     Objective   Physical Exam:   Weight: 105 kg (231 lb)  Pregravid BMI: 32.38  BP: 110/80  Fetal Heart Rate: 146 Fundal Height (cm): 38 cm Presentation: Vertex  Dilation: 2 Effacement (%): 70 Fetal Station: -2    ASSESSMENT   Janna Alvarado is a 29 y.o.  at 37w0d with a working estimated date of delivery of 2025, by Last Menstrual Period who presents for a routine prenatal visit.    The following concerns we addressed today:  Denies VB, LOF, CTX.  Endorses FM    PLAN  -GBS today, consents signed last visit  -IOL scheduled for 25  -RTC in 1 weeks    Problem List Items Addressed This Visit       37 weeks gestation of pregnancy ([facility]) - Primary    Overview   Healthy, no meds  4yo boy (LTCS, fetal intolerance), 2 yo girl ()  No prior GDM, HDP, or PPH  LMP certain 10/19/2024  Late presentation to care, 21 wks.  Declines level 2 and genetic screening      Desired provider in labor: [] CNM  [] Physician   [x] Either Acceptable  [x] Blood Products: [x] Yes, accepts [] No, needs counseling  [x] Initial BMI: Could not be calculated   [x] Prenatal Labs: [x]ORDERED  [x]REVIEWED  [x] Cervical Cancer Screening up to date due, late presentation to care, will get PP  [x] Rh status: O NEG  [x] Screen for IPV and Substance Use Risk: FORMER tobacco  [x] Genetic Screening (cfDNA):  DECLINED  [x] First Trimester Anatomy Screen (11-13.6 wks): DECLINED  [x] Baby ASA (initiated): DISCUSSED  [x] Pregnancy dated by:  LMP AND 21 WK US    [x] Anatomy US: (19-20 wks) DECLINED, Level 1: ALIZA, anterior placenta, no previa, 3V, Girl  [x] Federal Sterilization consent signed (if indicated): N/A  [x] 1hr GCT at 24-28wks: 188, [x] 3hr 1/4 abnormal  [x] Rhogam (if indicated): NEEDED, rhogam 25  [] Fetal Surveillance (if indicated):  [x] Tdap (27-32 wks, may be given up to 36 wks if initial window missed): discussed, declined    [x] Feeding Intentions: Bottle  [x] Postpartum Birth control method:  considering IUD  [] GBS at 36 - 37 wks:  [x] 39 weeks discussion of IOL vs. Expectant management: Previous spontaneous labor at 39w5d. Planning 40 wk IOL if no spontaneous labor  [x] Mode of delivery ( anticipated ): TOLAC 88.3% MFMU score           Relevant Orders    Group B Streptococcus (GBS) Prenatal Screen, Culture    Previous  section complicating pregnancy ([facility])    Overview   LTCS for fetal intolerance in labor   x 1  MFMU 83%         Rh negative state in antepartum period ([facility])    Overview   []  Baseline antibody screen  [x]  Second trimester antibody screen  []  Rhogam given at 28 weeks           Insufficient prenatal care in third trimester ([facility])    Overview   -late presentation to care             Daniel Henderson MD

## 2025-07-08 ENCOUNTER — APPOINTMENT (OUTPATIENT)
Facility: CLINIC | Age: 30
End: 2025-07-08
Payer: COMMERCIAL

## 2025-07-08 VITALS — WEIGHT: 231 LBS | SYSTOLIC BLOOD PRESSURE: 110 MMHG | BODY MASS INDEX: 41.58 KG/M2 | DIASTOLIC BLOOD PRESSURE: 80 MMHG

## 2025-07-08 DIAGNOSIS — O09.33 INSUFFICIENT PRENATAL CARE IN THIRD TRIMESTER (HHS-HCC): ICD-10-CM

## 2025-07-08 DIAGNOSIS — Z3A.37 37 WEEKS GESTATION OF PREGNANCY (HHS-HCC): Primary | ICD-10-CM

## 2025-07-08 DIAGNOSIS — O26.899 RH NEGATIVE STATE IN ANTEPARTUM PERIOD (HHS-HCC): ICD-10-CM

## 2025-07-08 DIAGNOSIS — Z67.91 RH NEGATIVE STATE IN ANTEPARTUM PERIOD (HHS-HCC): ICD-10-CM

## 2025-07-08 DIAGNOSIS — O34.219 PREVIOUS CESAREAN SECTION COMPLICATING PREGNANCY (HHS-HCC): ICD-10-CM

## 2025-07-08 PROCEDURE — 0501F PRENATAL FLOW SHEET: CPT | Performed by: OBSTETRICS & GYNECOLOGY

## 2025-07-08 ASSESSMENT — ENCOUNTER SYMPTOMS
NEUROLOGICAL NEGATIVE: 0
MUSCULOSKELETAL NEGATIVE: 0
RESPIRATORY NEGATIVE: 0
GASTROINTESTINAL NEGATIVE: 0
ENDOCRINE NEGATIVE: 0
CONSTITUTIONAL NEGATIVE: 0
HEMATOLOGIC/LYMPHATIC NEGATIVE: 0
CARDIOVASCULAR NEGATIVE: 0
EYES NEGATIVE: 0
PSYCHIATRIC NEGATIVE: 0
ALLERGIC/IMMUNOLOGIC NEGATIVE: 0

## 2025-07-11 LAB — GP B STREP SPEC QL CULT: NORMAL

## 2025-07-12 PROBLEM — Z3A.38 38 WEEKS GESTATION OF PREGNANCY (HHS-HCC): Status: ACTIVE | Noted: 2025-03-17

## 2025-07-12 NOTE — PROGRESS NOTES
Ob Visit  07/15/25     Objective   Physical Exam:   Weight: 104 kg (230 lb)  Pregravid BMI: 32.38  BP: 112/78  Fetal Heart Rate: 154 Fundal Height (cm): 38 cm Presentation: Vertex  Dilation: 1 Effacement (%): 50 Fetal Station: -2    ASSESSMENT   Janna Alvarado is a 29 y.o.  at 38w0d with a working estimated date of delivery of 2025, by Last Menstrual Period who presents for a routine prenatal visit.    The following concerns we addressed today:  Denies VB, LOF, CTX.  Endorses good FM    PLAN  -has IOL on  if no spontaneous labor prior  -TOLAC  -RTC in 5 weeks for PPV    Problem List Items Addressed This Visit       38 weeks gestation of pregnancy ([facility]) - Primary    Overview   Healthy, no meds  6yo boy (LTCS, fetal intolerance), 4 yo girl ()  No prior GDM, HDP, or PPH  LMP certain 10/19/2024  Late presentation to care, 21 wks.  Declines level 2 and genetic screening      Desired provider in labor: [] CNM  [] Physician   [x] Either Acceptable  [x] Blood Products: [x] Yes, accepts [] No, needs counseling  [x] Initial BMI: Could not be calculated   [x] Prenatal Labs: [x]ORDERED  [x]REVIEWED  [x] Cervical Cancer Screening up to date due, late presentation to care, will get PP  [x] Rh status: O NEG  [x] Screen for IPV and Substance Use Risk: FORMER tobacco  [x] Genetic Screening (cfDNA):  DECLINED  [x] First Trimester Anatomy Screen (11-13.6 wks): DECLINED  [x] Baby ASA (initiated): DISCUSSED  [x] Pregnancy dated by:  LMP AND 21 WK US    [x] Anatomy US: (19-20 wks) DECLINED, Level 1: ALIZA, anterior placenta, no previa, 3V, Girl  [x] Federal Sterilization consent signed (if indicated): N/A  [x] 1hr GCT at 24-28wks: 188, [x] 3hr 1/4 abnormal  [x] Rhogam (if indicated): NEEDED, rhogam 25  [] Fetal Surveillance (if indicated):  [x] Tdap (27-32 wks, may be given up to 36 wks if initial window missed): discussed, declined    [x] Feeding Intentions: Bottle  [x] Postpartum Birth control method:  considering IUD  [x] GBS at 36 - 37 wks: NEG  [x] 39 weeks discussion of IOL vs. Expectant management: Previous spontaneous labor at 39w5d. Planning 40 wk IOL if no spontaneous labor  [x] Mode of delivery ( anticipated ): TOLAC 88.3% MFMU score           Previous  section complicating pregnancy ([facility])    Overview   LTCS for fetal intolerance in labor   x 1  MFMU 83%         Rh negative state in antepartum period ([facility])    Overview   [x]  Baseline antibody screen  [x]  Second trimester antibody screen  [x]  Rhogam given at 28 weeks 25           Insufficient prenatal care in third trimester ([facility])    Overview   -late presentation to care             Daniel Henderson MD

## 2025-07-15 ENCOUNTER — APPOINTMENT (OUTPATIENT)
Facility: CLINIC | Age: 30
End: 2025-07-15
Payer: COMMERCIAL

## 2025-07-15 VITALS — DIASTOLIC BLOOD PRESSURE: 78 MMHG | SYSTOLIC BLOOD PRESSURE: 112 MMHG | WEIGHT: 230 LBS | BODY MASS INDEX: 41.4 KG/M2

## 2025-07-15 DIAGNOSIS — Z67.91 RH NEGATIVE STATE IN ANTEPARTUM PERIOD (HHS-HCC): ICD-10-CM

## 2025-07-15 DIAGNOSIS — Z3A.38 38 WEEKS GESTATION OF PREGNANCY (HHS-HCC): Primary | ICD-10-CM

## 2025-07-15 DIAGNOSIS — O26.899 RH NEGATIVE STATE IN ANTEPARTUM PERIOD (HHS-HCC): ICD-10-CM

## 2025-07-15 DIAGNOSIS — O34.219 PREVIOUS CESAREAN SECTION COMPLICATING PREGNANCY (HHS-HCC): ICD-10-CM

## 2025-07-15 DIAGNOSIS — O09.33 INSUFFICIENT PRENATAL CARE IN THIRD TRIMESTER (HHS-HCC): ICD-10-CM

## 2025-07-15 PROCEDURE — 0501F PRENATAL FLOW SHEET: CPT | Performed by: OBSTETRICS & GYNECOLOGY

## 2025-07-15 ASSESSMENT — EDINBURGH POSTNATAL DEPRESSION SCALE (EPDS)
I HAVE BEEN SO UNHAPPY THAT I HAVE HAD DIFFICULTY SLEEPING: NOT AT ALL
TOTAL SCORE: 0
THE THOUGHT OF HARMING MYSELF HAS OCCURRED TO ME: NEVER
I HAVE FELT SAD OR MISERABLE: NO, NOT AT ALL
THINGS HAVE BEEN GETTING ON TOP OF ME: NO, I HAVE BEEN COPING AS WELL AS EVER
I HAVE FELT SCARED OR PANICKY FOR NO GOOD REASON: NO, NOT AT ALL
I HAVE BEEN ABLE TO LAUGH AND SEE THE FUNNY SIDE OF THINGS: AS MUCH AS I ALWAYS COULD
I HAVE LOOKED FORWARD WITH ENJOYMENT TO THINGS: AS MUCH AS I EVER DID
I HAVE BEEN SO UNHAPPY THAT I HAVE BEEN CRYING: NO, NEVER
I HAVE BEEN ANXIOUS OR WORRIED FOR NO GOOD REASON: NO, NOT AT ALL
I HAVE BLAMED MYSELF UNNECESSARILY WHEN THINGS WENT WRONG: NO, NEVER

## 2025-07-15 ASSESSMENT — ENCOUNTER SYMPTOMS
OCCASIONAL FEELINGS OF UNSTEADINESS: 0
LOSS OF SENSATION IN FEET: 0

## 2025-07-15 ASSESSMENT — LIFESTYLE VARIABLES
HOW MANY STANDARD DRINKS CONTAINING ALCOHOL DO YOU HAVE ON A TYPICAL DAY: PATIENT DOES NOT DRINK
AUDIT-C TOTAL SCORE: 0
HOW OFTEN DO YOU HAVE SIX OR MORE DRINKS ON ONE OCCASION: NEVER
SKIP TO QUESTIONS 9-10: 1
HOW OFTEN DO YOU HAVE A DRINK CONTAINING ALCOHOL: NEVER

## 2025-07-15 ASSESSMENT — PATIENT HEALTH QUESTIONNAIRE - PHQ9
1. LITTLE INTEREST OR PLEASURE IN DOING THINGS: NOT AT ALL
2. FEELING DOWN, DEPRESSED OR HOPELESS: NOT AT ALL
SUM OF ALL RESPONSES TO PHQ9 QUESTIONS 1 & 2: 0

## 2025-07-22 ENCOUNTER — APPOINTMENT (OUTPATIENT)
Facility: CLINIC | Age: 30
End: 2025-07-22
Payer: COMMERCIAL

## 2025-07-23 ENCOUNTER — HOSPITAL ENCOUNTER (INPATIENT)
Facility: HOSPITAL | Age: 30
LOS: 2 days | Discharge: HOME | End: 2025-07-25
Attending: OBSTETRICS & GYNECOLOGY | Admitting: OBSTETRICS & GYNECOLOGY
Payer: COMMERCIAL

## 2025-07-23 ENCOUNTER — APPOINTMENT (OUTPATIENT)
Dept: OBSTETRICS AND GYNECOLOGY | Facility: HOSPITAL | Age: 30
End: 2025-07-23
Payer: COMMERCIAL

## 2025-07-23 DIAGNOSIS — M79.606 TENDERNESS OF LOWER EXTREMITY, UNSPECIFIED LATERALITY: ICD-10-CM

## 2025-07-23 DIAGNOSIS — M79.604 LOWER EXTREMITY PAIN, BILATERAL: Primary | ICD-10-CM

## 2025-07-23 DIAGNOSIS — Z34.90 TERM PREGNANCY (HHS-HCC): ICD-10-CM

## 2025-07-23 DIAGNOSIS — I80.03 THROMBOPHLEBITIS OF SUPERFICIAL VEINS OF BOTH LOWER EXTREMITIES: ICD-10-CM

## 2025-07-23 DIAGNOSIS — M79.605 LOWER EXTREMITY PAIN, BILATERAL: Primary | ICD-10-CM

## 2025-07-23 LAB
ABO GROUP (TYPE) IN BLOOD: NORMAL
ANTIBODY SCREEN: NORMAL
ERYTHROCYTE [DISTWIDTH] IN BLOOD BY AUTOMATED COUNT: 13.9 % (ref 11.5–14.5)
HCT VFR BLD AUTO: 34.7 % (ref 36–46)
HGB BLD-MCNC: 12.3 G/DL (ref 12–16)
MCH RBC QN AUTO: 31.8 PG (ref 26–34)
MCHC RBC AUTO-ENTMCNC: 35.4 G/DL (ref 32–36)
MCV RBC AUTO: 90 FL (ref 80–100)
NRBC BLD-RTO: 0 /100 WBCS (ref 0–0)
PLATELET # BLD AUTO: 245 X10*3/UL (ref 150–450)
RBC # BLD AUTO: 3.87 X10*6/UL (ref 4–5.2)
RH FACTOR (ANTIGEN D): NORMAL
WBC # BLD AUTO: 10.7 X10*3/UL (ref 4.4–11.3)

## 2025-07-23 PROCEDURE — 7210000002 HC LABOR PER HOUR

## 2025-07-23 PROCEDURE — 2500000004 HC RX 250 GENERAL PHARMACY W/ HCPCS (ALT 636 FOR OP/ED): Performed by: OBSTETRICS & GYNECOLOGY

## 2025-07-23 PROCEDURE — 85027 COMPLETE CBC AUTOMATED: CPT | Performed by: OBSTETRICS & GYNECOLOGY

## 2025-07-23 PROCEDURE — 36415 COLL VENOUS BLD VENIPUNCTURE: CPT | Performed by: OBSTETRICS & GYNECOLOGY

## 2025-07-23 PROCEDURE — 86780 TREPONEMA PALLIDUM: CPT | Mod: GEALAB | Performed by: OBSTETRICS & GYNECOLOGY

## 2025-07-23 PROCEDURE — 1120000001 HC OB PRIVATE ROOM DAILY

## 2025-07-23 PROCEDURE — 86900 BLOOD TYPING SEROLOGIC ABO: CPT | Performed by: OBSTETRICS & GYNECOLOGY

## 2025-07-23 RX ORDER — METHYLERGONOVINE MALEATE 0.2 MG/ML
0.2 INJECTION INTRAVENOUS ONCE AS NEEDED
Status: DISCONTINUED | OUTPATIENT
Start: 2025-07-23 | End: 2025-07-24

## 2025-07-23 RX ORDER — OXYTOCIN 10 [USP'U]/ML
10 INJECTION, SOLUTION INTRAMUSCULAR; INTRAVENOUS ONCE AS NEEDED
Status: DISCONTINUED | OUTPATIENT
Start: 2025-07-23 | End: 2025-07-24

## 2025-07-23 RX ORDER — CALCIUM CARBONATE 200(500)MG
1 TABLET,CHEWABLE ORAL EVERY 6 HOURS PRN
Status: DISCONTINUED | OUTPATIENT
Start: 2025-07-23 | End: 2025-07-24

## 2025-07-23 RX ORDER — LOPERAMIDE HYDROCHLORIDE 2 MG/1
4 CAPSULE ORAL EVERY 2 HOUR PRN
Status: DISCONTINUED | OUTPATIENT
Start: 2025-07-23 | End: 2025-07-24

## 2025-07-23 RX ORDER — OXYTOCIN/0.9 % SODIUM CHLORIDE 30/500 ML
2-30 PLASTIC BAG, INJECTION (ML) INTRAVENOUS CONTINUOUS
Status: DISCONTINUED | OUTPATIENT
Start: 2025-07-23 | End: 2025-07-24

## 2025-07-23 RX ORDER — OXYTOCIN/0.9 % SODIUM CHLORIDE 30/500 ML
60 PLASTIC BAG, INJECTION (ML) INTRAVENOUS ONCE AS NEEDED
Status: DISCONTINUED | OUTPATIENT
Start: 2025-07-23 | End: 2025-07-24

## 2025-07-23 RX ORDER — MISOPROSTOL 200 UG/1
800 TABLET ORAL ONCE AS NEEDED
Status: COMPLETED | OUTPATIENT
Start: 2025-07-23 | End: 2025-07-24

## 2025-07-23 RX ORDER — CARBOPROST TROMETHAMINE 250 UG/ML
250 INJECTION, SOLUTION INTRAMUSCULAR ONCE AS NEEDED
Status: DISCONTINUED | OUTPATIENT
Start: 2025-07-23 | End: 2025-07-24

## 2025-07-23 RX ORDER — TERBUTALINE SULFATE 1 MG/ML
0.25 INJECTION SUBCUTANEOUS ONCE AS NEEDED
Status: DISCONTINUED | OUTPATIENT
Start: 2025-07-23 | End: 2025-07-24

## 2025-07-23 RX ORDER — LABETALOL HYDROCHLORIDE 5 MG/ML
20 INJECTION, SOLUTION INTRAVENOUS ONCE AS NEEDED
Status: DISCONTINUED | OUTPATIENT
Start: 2025-07-23 | End: 2025-07-24

## 2025-07-23 RX ORDER — NALBUPHINE HYDROCHLORIDE 10 MG/ML
10 INJECTION INTRAMUSCULAR; INTRAVENOUS; SUBCUTANEOUS
Status: DISCONTINUED | OUTPATIENT
Start: 2025-07-23 | End: 2025-07-24

## 2025-07-23 RX ORDER — OXYTOCIN/0.9 % SODIUM CHLORIDE 30/500 ML
60 PLASTIC BAG, INJECTION (ML) INTRAVENOUS ONCE AS NEEDED
Status: COMPLETED | OUTPATIENT
Start: 2025-07-23 | End: 2025-07-24

## 2025-07-23 RX ORDER — TRANEXAMIC ACID 1 G/10ML
1000 INJECTION, SOLUTION INTRAVENOUS ONCE AS NEEDED
Status: DISCONTINUED | OUTPATIENT
Start: 2025-07-23 | End: 2025-07-24

## 2025-07-23 RX ORDER — ONDANSETRON HYDROCHLORIDE 2 MG/ML
4 INJECTION, SOLUTION INTRAVENOUS EVERY 6 HOURS PRN
Status: DISCONTINUED | OUTPATIENT
Start: 2025-07-23 | End: 2025-07-24

## 2025-07-23 RX ORDER — SODIUM CHLORIDE, SODIUM LACTATE, POTASSIUM CHLORIDE, CALCIUM CHLORIDE 600; 310; 30; 20 MG/100ML; MG/100ML; MG/100ML; MG/100ML
75 INJECTION, SOLUTION INTRAVENOUS CONTINUOUS
Status: DISCONTINUED | OUTPATIENT
Start: 2025-07-23 | End: 2025-07-24

## 2025-07-23 RX ORDER — NIFEDIPINE 10 MG/1
10 CAPSULE ORAL ONCE AS NEEDED
Status: DISCONTINUED | OUTPATIENT
Start: 2025-07-23 | End: 2025-07-24

## 2025-07-23 RX ORDER — LIDOCAINE HYDROCHLORIDE 10 MG/ML
30 INJECTION, SOLUTION INFILTRATION; PERINEURAL ONCE AS NEEDED
Status: DISCONTINUED | OUTPATIENT
Start: 2025-07-23 | End: 2025-07-24

## 2025-07-23 RX ORDER — ONDANSETRON 4 MG/1
4 TABLET, FILM COATED ORAL EVERY 6 HOURS PRN
Status: DISCONTINUED | OUTPATIENT
Start: 2025-07-23 | End: 2025-07-24

## 2025-07-23 RX ORDER — HYDRALAZINE HYDROCHLORIDE 20 MG/ML
5 INJECTION INTRAMUSCULAR; INTRAVENOUS ONCE AS NEEDED
Status: DISCONTINUED | OUTPATIENT
Start: 2025-07-23 | End: 2025-07-24

## 2025-07-23 RX ADMIN — Medication 2 MILLI-UNITS/MIN: at 21:45

## 2025-07-23 RX ADMIN — SODIUM CHLORIDE, SODIUM LACTATE, POTASSIUM CHLORIDE, AND CALCIUM CHLORIDE 75 ML/HR: .6; .31; .03; .02 INJECTION, SOLUTION INTRAVENOUS at 21:45

## 2025-07-23 SDOH — SOCIAL STABILITY: SOCIAL INSECURITY: WITHIN THE LAST YEAR, HAVE YOU BEEN HUMILIATED OR EMOTIONALLY ABUSED IN OTHER WAYS BY YOUR PARTNER OR EX-PARTNER?: NO

## 2025-07-23 SDOH — SOCIAL STABILITY: SOCIAL INSECURITY: WITHIN THE LAST YEAR, HAVE YOU BEEN AFRAID OF YOUR PARTNER OR EX-PARTNER?: NO

## 2025-07-23 SDOH — SOCIAL STABILITY: SOCIAL INSECURITY
WITHIN THE LAST YEAR, HAVE YOU BEEN KICKED, HIT, SLAPPED, OR OTHERWISE PHYSICALLY HURT BY YOUR PARTNER OR EX-PARTNER?: NO

## 2025-07-23 SDOH — HEALTH STABILITY: MENTAL HEALTH: NON-SPECIFIC ACTIVE SUICIDAL THOUGHTS (PAST 1 MONTH): NO

## 2025-07-23 SDOH — SOCIAL STABILITY: SOCIAL INSECURITY: VERBAL ABUSE: DENIES

## 2025-07-23 SDOH — SOCIAL STABILITY: SOCIAL INSECURITY: PHYSICAL ABUSE: DENIES

## 2025-07-23 SDOH — HEALTH STABILITY: MENTAL HEALTH: WISH TO BE DEAD (PAST 1 MONTH): NO

## 2025-07-23 SDOH — ECONOMIC STABILITY: HOUSING INSECURITY: DO YOU FEEL UNSAFE GOING BACK TO THE PLACE WHERE YOU ARE LIVING?: NO

## 2025-07-23 SDOH — ECONOMIC STABILITY: FOOD INSECURITY: WITHIN THE PAST 12 MONTHS, YOU WORRIED THAT YOUR FOOD WOULD RUN OUT BEFORE YOU GOT THE MONEY TO BUY MORE.: NEVER TRUE

## 2025-07-23 SDOH — SOCIAL STABILITY: SOCIAL INSECURITY: DOES ANYONE TRY TO KEEP YOU FROM HAVING/CONTACTING OTHER FRIENDS OR DOING THINGS OUTSIDE YOUR HOME?: NO

## 2025-07-23 SDOH — SOCIAL STABILITY: SOCIAL INSECURITY
WITHIN THE LAST YEAR, HAVE YOU BEEN RAPED OR FORCED TO HAVE ANY KIND OF SEXUAL ACTIVITY BY YOUR PARTNER OR EX-PARTNER?: NO

## 2025-07-23 SDOH — HEALTH STABILITY: MENTAL HEALTH: WERE YOU ABLE TO COMPLETE ALL THE BEHAVIORAL HEALTH SCREENINGS?: YES

## 2025-07-23 SDOH — HEALTH STABILITY: MENTAL HEALTH: SUICIDAL BEHAVIOR (LIFETIME): NO

## 2025-07-23 SDOH — HEALTH STABILITY: MENTAL HEALTH: HAVE YOU USED ANY SUBSTANCES (CANABIS, COCAINE, HEROIN, HALLUCINOGENS, INHALANTS, ETC.) IN THE PAST 12 MONTHS?: NO

## 2025-07-23 SDOH — ECONOMIC STABILITY: FOOD INSECURITY: WITHIN THE PAST 12 MONTHS, THE FOOD YOU BOUGHT JUST DIDN'T LAST AND YOU DIDN'T HAVE MONEY TO GET MORE.: NEVER TRUE

## 2025-07-23 SDOH — SOCIAL STABILITY: SOCIAL INSECURITY: ARE THERE ANY APPARENT SIGNS OF INJURIES/BEHAVIORS THAT COULD BE RELATED TO ABUSE/NEGLECT?: NO

## 2025-07-23 SDOH — SOCIAL STABILITY: SOCIAL INSECURITY: ABUSE SCREEN: ADULT

## 2025-07-23 SDOH — SOCIAL STABILITY: SOCIAL INSECURITY: HAVE YOU HAD THOUGHTS OF HARMING ANYONE ELSE?: NO

## 2025-07-23 SDOH — HEALTH STABILITY: MENTAL HEALTH: HAVE YOU USED ANY PRESCRIPTION DRUGS OTHER THAN PRESCRIBED IN THE PAST 12 MONTHS?: NO

## 2025-07-23 SDOH — SOCIAL STABILITY: SOCIAL INSECURITY: HAVE YOU HAD ANY THOUGHTS OF HARMING ANYONE ELSE?: NO

## 2025-07-23 SDOH — SOCIAL STABILITY: SOCIAL INSECURITY: HAS ANYONE EVER THREATENED TO HURT YOUR FAMILY OR YOUR PETS?: NO

## 2025-07-23 SDOH — SOCIAL STABILITY: SOCIAL INSECURITY: DO YOU FEEL ANYONE HAS EXPLOITED OR TAKEN ADVANTAGE OF YOU FINANCIALLY OR OF YOUR PERSONAL PROPERTY?: NO

## 2025-07-23 SDOH — SOCIAL STABILITY: SOCIAL INSECURITY: ARE YOU OR HAVE YOU BEEN THREATENED OR ABUSED PHYSICALLY, EMOTIONALLY, OR SEXUALLY BY ANYONE?: NO

## 2025-07-23 ASSESSMENT — LIFESTYLE VARIABLES
AUDIT-C TOTAL SCORE: 0
AUDIT-C TOTAL SCORE: 0
HOW OFTEN DO YOU HAVE 6 OR MORE DRINKS ON ONE OCCASION: NEVER
HOW MANY STANDARD DRINKS CONTAINING ALCOHOL DO YOU HAVE ON A TYPICAL DAY: PATIENT DOES NOT DRINK
HOW OFTEN DO YOU HAVE A DRINK CONTAINING ALCOHOL: NEVER
SKIP TO QUESTIONS 9-10: 1

## 2025-07-23 ASSESSMENT — ACTIVITIES OF DAILY LIVING (ADL)
LACK_OF_TRANSPORTATION: NO
LACK_OF_TRANSPORTATION: NO

## 2025-07-23 ASSESSMENT — PATIENT HEALTH QUESTIONNAIRE - PHQ9
SUM OF ALL RESPONSES TO PHQ9 QUESTIONS 1 & 2: 0
1. LITTLE INTEREST OR PLEASURE IN DOING THINGS: NOT AT ALL
2. FEELING DOWN, DEPRESSED OR HOPELESS: NOT AT ALL

## 2025-07-23 ASSESSMENT — PAIN SCALES - GENERAL: PAINLEVEL_OUTOF10: 0 - NO PAIN

## 2025-07-24 ENCOUNTER — ANESTHESIA EVENT (OUTPATIENT)
Dept: OBSTETRICS AND GYNECOLOGY | Facility: HOSPITAL | Age: 30
End: 2025-07-24
Payer: COMMERCIAL

## 2025-07-24 ENCOUNTER — ANESTHESIA (OUTPATIENT)
Dept: OBSTETRICS AND GYNECOLOGY | Facility: HOSPITAL | Age: 30
End: 2025-07-24
Payer: COMMERCIAL

## 2025-07-24 PROBLEM — M79.7 FIBROMYALGIA: Status: RESOLVED | Noted: 2023-05-05 | Resolved: 2025-07-24

## 2025-07-24 PROBLEM — Z3A.38 38 WEEKS GESTATION OF PREGNANCY (HHS-HCC): Status: RESOLVED | Noted: 2025-03-17 | Resolved: 2025-07-24

## 2025-07-24 PROBLEM — O34.219 PREVIOUS CESAREAN SECTION COMPLICATING PREGNANCY (HHS-HCC): Status: RESOLVED | Noted: 2025-03-19 | Resolved: 2025-07-24

## 2025-07-24 PROBLEM — O09.33 INSUFFICIENT PRENATAL CARE IN THIRD TRIMESTER (HHS-HCC): Status: RESOLVED | Noted: 2025-05-19 | Resolved: 2025-07-24

## 2025-07-24 PROBLEM — Z34.90 TERM PREGNANCY (HHS-HCC): Status: RESOLVED | Noted: 2025-07-23 | Resolved: 2025-07-24

## 2025-07-24 PROBLEM — Z67.91 RH NEGATIVE STATE IN ANTEPARTUM PERIOD (HHS-HCC): Status: RESOLVED | Noted: 2025-05-19 | Resolved: 2025-07-24

## 2025-07-24 PROBLEM — O26.899 RH NEGATIVE STATE IN ANTEPARTUM PERIOD (HHS-HCC): Status: RESOLVED | Noted: 2025-05-19 | Resolved: 2025-07-24

## 2025-07-24 LAB
BB ANTIBODY IDENTIFICATION: NORMAL
CASE #: NORMAL
TREPONEMA PALLIDUM IGG+IGM AB [PRESENCE] IN SERUM OR PLASMA BY IMMUNOASSAY: NONREACTIVE

## 2025-07-24 PROCEDURE — 0KQM0ZZ REPAIR PERINEUM MUSCLE, OPEN APPROACH: ICD-10-PCS | Performed by: OBSTETRICS & GYNECOLOGY

## 2025-07-24 PROCEDURE — 2500000001 HC RX 250 WO HCPCS SELF ADMINISTERED DRUGS (ALT 637 FOR MEDICARE OP): Performed by: OBSTETRICS & GYNECOLOGY

## 2025-07-24 PROCEDURE — 2500000004 HC RX 250 GENERAL PHARMACY W/ HCPCS (ALT 636 FOR OP/ED): Performed by: OBSTETRICS & GYNECOLOGY

## 2025-07-24 PROCEDURE — 7100000016 HC LABOR RECOVERY PER HOUR

## 2025-07-24 PROCEDURE — 7210000002 HC LABOR PER HOUR

## 2025-07-24 PROCEDURE — 59409 OBSTETRICAL CARE: CPT | Performed by: OBSTETRICS & GYNECOLOGY

## 2025-07-24 PROCEDURE — 59400 OBSTETRICAL CARE: CPT | Performed by: OBSTETRICS & GYNECOLOGY

## 2025-07-24 PROCEDURE — 51701 INSERT BLADDER CATHETER: CPT

## 2025-07-24 PROCEDURE — 2500000002 HC RX 250 W HCPCS SELF ADMINISTERED DRUGS (ALT 637 FOR MEDICARE OP, ALT 636 FOR OP/ED): Performed by: OBSTETRICS & GYNECOLOGY

## 2025-07-24 PROCEDURE — 2500000004 HC RX 250 GENERAL PHARMACY W/ HCPCS (ALT 636 FOR OP/ED): Performed by: NURSE ANESTHETIST, CERTIFIED REGISTERED

## 2025-07-24 PROCEDURE — 86870 RBC ANTIBODY IDENTIFICATION: CPT

## 2025-07-24 PROCEDURE — 3700000014 EPIDURAL BLOCK: Performed by: NURSE ANESTHETIST, CERTIFIED REGISTERED

## 2025-07-24 PROCEDURE — 1120000001 HC OB PRIVATE ROOM DAILY

## 2025-07-24 PROCEDURE — 59050 FETAL MONITOR W/REPORT: CPT

## 2025-07-24 RX ORDER — ACETAMINOPHEN 325 MG/1
975 TABLET ORAL EVERY 6 HOURS
Status: DISCONTINUED | OUTPATIENT
Start: 2025-07-24 | End: 2025-07-25 | Stop reason: HOSPADM

## 2025-07-24 RX ORDER — FENTANYL/ROPIVACAINE/NS/PF 2MCG/ML-.2
0-25 PLASTIC BAG, INJECTION (ML) INJECTION CONTINUOUS
Refills: 0 | Status: DISCONTINUED | OUTPATIENT
Start: 2025-07-24 | End: 2025-07-24

## 2025-07-24 RX ORDER — ENOXAPARIN SODIUM 100 MG/ML
40 INJECTION SUBCUTANEOUS EVERY 24 HOURS
Status: DISCONTINUED | OUTPATIENT
Start: 2025-07-25 | End: 2025-07-25 | Stop reason: HOSPADM

## 2025-07-24 RX ORDER — OXYTOCIN/0.9 % SODIUM CHLORIDE 30/500 ML
60 PLASTIC BAG, INJECTION (ML) INTRAVENOUS ONCE AS NEEDED
Status: DISCONTINUED | OUTPATIENT
Start: 2025-07-24 | End: 2025-07-25 | Stop reason: HOSPADM

## 2025-07-24 RX ORDER — LIDOCAINE HYDROCHLORIDE AND EPINEPHRINE 15; 5 MG/ML; UG/ML
INJECTION, SOLUTION EPIDURAL AS NEEDED
Status: DISCONTINUED | OUTPATIENT
Start: 2025-07-24 | End: 2025-07-24

## 2025-07-24 RX ORDER — CARBOPROST TROMETHAMINE 250 UG/ML
250 INJECTION, SOLUTION INTRAMUSCULAR ONCE AS NEEDED
Status: DISCONTINUED | OUTPATIENT
Start: 2025-07-24 | End: 2025-07-25 | Stop reason: HOSPADM

## 2025-07-24 RX ORDER — TRANEXAMIC ACID 1 G/10ML
1000 INJECTION, SOLUTION INTRAVENOUS ONCE AS NEEDED
Status: DISCONTINUED | OUTPATIENT
Start: 2025-07-24 | End: 2025-07-25 | Stop reason: HOSPADM

## 2025-07-24 RX ORDER — ADHESIVE BANDAGE
10 BANDAGE TOPICAL
Status: DISCONTINUED | OUTPATIENT
Start: 2025-07-24 | End: 2025-07-25 | Stop reason: HOSPADM

## 2025-07-24 RX ORDER — ONDANSETRON HYDROCHLORIDE 2 MG/ML
4 INJECTION, SOLUTION INTRAVENOUS EVERY 6 HOURS PRN
Status: DISCONTINUED | OUTPATIENT
Start: 2025-07-24 | End: 2025-07-25 | Stop reason: HOSPADM

## 2025-07-24 RX ORDER — LABETALOL HYDROCHLORIDE 5 MG/ML
20 INJECTION, SOLUTION INTRAVENOUS ONCE AS NEEDED
Status: DISCONTINUED | OUTPATIENT
Start: 2025-07-24 | End: 2025-07-25 | Stop reason: HOSPADM

## 2025-07-24 RX ORDER — OXYTOCIN 10 [USP'U]/ML
10 INJECTION, SOLUTION INTRAMUSCULAR; INTRAVENOUS ONCE AS NEEDED
Status: DISCONTINUED | OUTPATIENT
Start: 2025-07-24 | End: 2025-07-25 | Stop reason: HOSPADM

## 2025-07-24 RX ORDER — ONDANSETRON 4 MG/1
4 TABLET, FILM COATED ORAL EVERY 6 HOURS PRN
Status: DISCONTINUED | OUTPATIENT
Start: 2025-07-24 | End: 2025-07-25 | Stop reason: HOSPADM

## 2025-07-24 RX ORDER — DIPHENHYDRAMINE HCL 25 MG
25 CAPSULE ORAL EVERY 6 HOURS PRN
Status: DISCONTINUED | OUTPATIENT
Start: 2025-07-24 | End: 2025-07-25 | Stop reason: HOSPADM

## 2025-07-24 RX ORDER — DIPHENHYDRAMINE HYDROCHLORIDE 50 MG/ML
25 INJECTION, SOLUTION INTRAMUSCULAR; INTRAVENOUS EVERY 6 HOURS PRN
Status: DISCONTINUED | OUTPATIENT
Start: 2025-07-24 | End: 2025-07-25 | Stop reason: HOSPADM

## 2025-07-24 RX ORDER — SIMETHICONE 80 MG
80 TABLET,CHEWABLE ORAL 4 TIMES DAILY PRN
Status: DISCONTINUED | OUTPATIENT
Start: 2025-07-24 | End: 2025-07-25 | Stop reason: HOSPADM

## 2025-07-24 RX ORDER — LOPERAMIDE HYDROCHLORIDE 2 MG/1
4 CAPSULE ORAL EVERY 2 HOUR PRN
Status: DISCONTINUED | OUTPATIENT
Start: 2025-07-24 | End: 2025-07-25 | Stop reason: HOSPADM

## 2025-07-24 RX ORDER — MISOPROSTOL 200 UG/1
800 TABLET ORAL ONCE AS NEEDED
Status: DISCONTINUED | OUTPATIENT
Start: 2025-07-24 | End: 2025-07-25 | Stop reason: HOSPADM

## 2025-07-24 RX ORDER — IBUPROFEN 600 MG/1
600 TABLET, FILM COATED ORAL EVERY 6 HOURS
Status: DISCONTINUED | OUTPATIENT
Start: 2025-07-24 | End: 2025-07-25 | Stop reason: HOSPADM

## 2025-07-24 RX ORDER — HYDRALAZINE HYDROCHLORIDE 20 MG/ML
5 INJECTION INTRAMUSCULAR; INTRAVENOUS ONCE AS NEEDED
Status: DISCONTINUED | OUTPATIENT
Start: 2025-07-24 | End: 2025-07-25 | Stop reason: HOSPADM

## 2025-07-24 RX ORDER — POLYETHYLENE GLYCOL 3350 17 G/17G
17 POWDER, FOR SOLUTION ORAL 2 TIMES DAILY PRN
Status: DISCONTINUED | OUTPATIENT
Start: 2025-07-24 | End: 2025-07-25 | Stop reason: HOSPADM

## 2025-07-24 RX ORDER — METHYLERGONOVINE MALEATE 0.2 MG/ML
0.2 INJECTION INTRAVENOUS ONCE AS NEEDED
Status: DISCONTINUED | OUTPATIENT
Start: 2025-07-24 | End: 2025-07-25 | Stop reason: HOSPADM

## 2025-07-24 RX ADMIN — LIDOCAINE HYDROCHLORIDE AND EPINEPHRINE 3 ML: 15; 5 INJECTION, SOLUTION EPIDURAL at 04:38

## 2025-07-24 RX ADMIN — Medication 3 ML: at 04:41

## 2025-07-24 RX ADMIN — Medication 10 ML/HR: at 04:43

## 2025-07-24 RX ADMIN — MISOPROSTOL 800 MCG: 200 TABLET ORAL at 12:15

## 2025-07-24 RX ADMIN — ACETAMINOPHEN 975 MG: 325 TABLET ORAL at 19:08

## 2025-07-24 RX ADMIN — IBUPROFEN 600 MG: 600 TABLET ORAL at 19:08

## 2025-07-24 RX ADMIN — Medication 3 ML: at 04:40

## 2025-07-24 RX ADMIN — Medication 60 MILLI-UNITS/MIN: at 12:15

## 2025-07-24 ASSESSMENT — PAIN SCALES - GENERAL
PAINLEVEL_OUTOF10: 0 - NO PAIN
PAINLEVEL_OUTOF10: 2
PAIN_LEVEL: 0
PAINLEVEL_OUTOF10: 0 - NO PAIN

## 2025-07-24 ASSESSMENT — PAIN DESCRIPTION - DESCRIPTORS: DESCRIPTORS: ACHING;CRAMPING;SORE

## 2025-07-24 NOTE — CARE PLAN
Problem: Vaginal Birth or  Section  Goal: Fetal and maternal status remain reassuring during the birth process  Outcome: Met  Goal: Prevention of malpresentation/labor dystocia through delivery  Outcome: Met  Goal: Demonstrates labor coping techniques through delivery  Outcome: Met  Goal: Minimal s/sx of HDP and BP<160/110  Outcome: Met  Goal: No s/sx of infection through recovery  Outcome: Met  Goal: No s/sx of hemorrhage through recovery  Outcome: Met     Problem: Postpartum  Goal: Experiences normal postpartum course  Outcome: Progressing  Goal: Appropriate maternal -  bonding  Outcome: Progressing  Goal: Establish and maintain infant feeding pattern for adequate nutrition  Outcome: Progressing  Goal: Incisions, wounds, or drain sites healing without S/S of infection  Outcome: Progressing  Goal: No s/sx infection  Outcome: Progressing  Goal: No s/sx of hemorrhage  Outcome: Progressing  Goal: Minimal s/sx of HDP and BP<160/110  Outcome: Progressing     Problem: Pain - Adult  Goal: Verbalizes/displays adequate comfort level or baseline comfort level  Outcome: Progressing     Problem: Safety - Adult  Goal: Free from fall injury  Outcome: Progressing     Problem: Discharge Planning  Goal: Discharge to home or other facility with appropriate resources  Outcome: Progressing   The patient's goals for the shift include      The clinical goals for the shift include FHR tracing will remain reassuring throughout induction of labor    Goal met.  at 1032. 2nd degree lac with repair. PPH received rectal cytotec and 2nd bag of pitocin.

## 2025-07-24 NOTE — ANESTHESIA PROCEDURE NOTES
Epidural Block    Patient location during procedure: OB  Start time: 7/24/2025 4:28 AM  End time: 7/24/2025 4:32 AM  Reason for block: labor analgesia  Staffing  Performed: CRNA   Authorized by: MAVERICK Lopez    Performed by: MAVERICK Lopez    Preanesthetic Checklist  Completed: patient identified, IV checked, risks and benefits discussed, surgical consent, pre-op evaluation, timeout performed and sterile techniques followed  Block Timeout  RN/Licensed healthcare professional reads aloud to the Anesthesia provider and entire team: Patient identity, procedure with side and site, patient position, and as applicable the availability of implants/special equipment/special requirements.  Patient on coagulant treatment: no  Timeout performed at: 7/24/2025 4:25 AM  Block Placement  Patient position: sitting  Prep: ChloraPrep  Sterility prep: cap, drape, gloves, hand and mask  Sedation level: no sedation  Patient monitoring: blood pressure, continuous pulse oximetry and heart rate  Approach: midline  Local numbing: lidocaine 1% to skin and subcutaneous tissues  Vertebral space: lumbar  Lumbar location: L3-L4  Epidural  Loss of resistance technique: saline  Guidance: landmark technique        Needle  Needle type: Tuohy   Needle gauge: 17  Needle length: 8.9cm  Needle insertion depth: 7 cm  Catheter type: multi-orifice  Catheter size: 19 G  Catheter at skin depth: 14 cm  Catheter securement method: clear occlusive dressing    Test dose: lidocaine 1.5% with epinephrine 1-to-200,000  Test dose: lidocaine 1.5% with epinephrine 1-to-200,000  Test dose result: no positive test dose            Assessment  Sensory level: T10 bilateral  Block outcome: pain improved  Number of attempts: 1  Events: no positive test dose  Procedure assessment: patient tolerated procedure well with no immediate complications

## 2025-07-24 NOTE — DISCHARGE SUMMARY
Discharge Summary    Admission Date: 2025  Discharge Date: 2025    Discharge Diagnosis  Term pregnancy ([facility])    Hospital Course  Delivery Date: 2025 10:32 AM  Delivery type: Vaginal, Spontaneous   GA at delivery: 39w5d  Outcome: Living  Anesthesia during delivery: Epidural  Intrapartum complications: None  Feeding method: Breastfeeding Status: Yes     Procedures: none  Contraception at discharge: none    The patient was admitted to the hospital for IOL at 39w4d in the setting of one previous low transverse  followed by one previous .  She underwent a vaginal delivery of a viable female infant.    On the day of discharge she was generally doing well including with adequate postpartum pain management and no excess postpartum bleeding.    The patient reported some  infection with redness associated with venous varicosities on the left thigh but the redness was better. She also indicated some pain with venous varicosities posterior to the right knee. She has no personal or family history of thrombosis.     Bilateral lower extremity venous varicosities were noted. There was tenderness posterior to the right knee. LE Dopplers were ordered. Bilateral lower extremity Doppler studies were negative for signs of DVT.    Thrombosis warning signs were reviewed with the patient.    The patient was found to have bilateral lower extremity venous varicosities and bilateral lower extremity superficial thrombophlebitis (posterior to right knee and left lateral mid thigh).    Related to her lower extremity venous varicosities and superficial thrombophlebitis: compression hose, elevation of her legs when she is not up, and cold and hot compresses prn for discomfort were ordered.    Pertinent Physical Exam At Time of Discharge    Gen - NAD  Neuro - alert  Psych - normal affect  Abdomen - soft, uterine fundus - at or slightly below the umbilicus  Lower extremities - mild pretibial edema bilaterally.  Multiple bilateral superficial venous varicosities. Mild ecchymosis and tenderness around the venous varicosities on the left lateral mid thigh. Mild tenderness around the venous varicosities of the posterior right knee.  Last Vitals:  Temp Pulse Resp BP MAP Pulse Ox   37.3 °C (99.1 °F) 67 16 117/72 90 98 %     Discharge Meds     Your medication list        START taking these medications        Instructions Last Dose Given Next Dose Due   acetaminophen 325 mg tablet  Commonly known as: Tylenol      Take 3 tablets (975 mg) by mouth every 6 hours. Discontinue when no longer having postpartum pain.       aspirin 81 mg EC tablet      Take 1 tablet (81 mg) by mouth once daily.       ibuprofen 600 mg tablet      Take 1 tablet (600 mg) by mouth every 6 hours. Discontinue when no longer having postpartum pain.              CONTINUE taking these medications        Instructions Last Dose Given Next Dose Due   PNV 29-1 ORAL                     Where to Get Your Medications        These medications were sent to [id number] - [facility] [address]  [address] [facility] [address]      Phone: [phone]   ibuprofen 600 mg tablet       Information about where to get these medications is not yet available    Ask your nurse or doctor about these medications  acetaminophen 325 mg tablet  aspirin 81 mg EC tablet        Complications Requiring Follow-Up  Routine pp care    Test Results Pending At Discharge  Pending Labs       No current pending labs.          Outpatient Follow-Up  No future appointments.    Gerard Hodges MD

## 2025-07-24 NOTE — ANESTHESIA POSTPROCEDURE EVALUATION
"Patient: Janna Alvarado    Procedure Summary       Date: 07/24/25 Room / Location:     Anesthesia Start: 0428 Anesthesia Stop: 1032    Procedure: Labor Analgesia Diagnosis:     Scheduled Providers:  Responsible Provider: MAVERICK Romero    Anesthesia Type: epidural ASA Status: 2            Anesthesia Type: epidural    /62   Pulse 64   Temp 37.2 °C (99 °F) (Oral)   Resp 18   Ht 1.626 m (5' 4\")   Wt 105 kg (231 lb 4.2 oz)   LMP 10/19/2024 (Exact Date)   SpO2 98%   BMI 39.70 kg/m²         Anesthesia Post Evaluation    Patient location during evaluation: bedside  Patient participation: complete - patient participated  Level of consciousness: awake and alert  Pain score: 0  Pain management: adequate  Airway patency: patent  Cardiovascular status: acceptable  Respiratory status: acceptable  Hydration status: acceptable  Postoperative Nausea and Vomiting: none        There were no known notable events for this encounter.    "

## 2025-07-24 NOTE — ANESTHESIA PREPROCEDURE EVALUATION
Patient: Janna Alvarado    Evaluation Method: In-person visit    Procedure Information    Date: 25  Procedure: Labor Analgesia         Relevant Problems   No relevant active problems       Clinical information reviewed:   Tobacco  Allergies  Meds  Problems  Med Hx  Surg Hx   Fam Hx          NPO Detail:  No data recorded     OB/Gyn Evaluation    Present Pregnancy    Patient is pregnant now.  (+) , previous  section   Obstetric History    (+)  section              Physical Exam    Airway  Mallampati: II     Cardiovascular    Dental    Pulmonary    Abdominal            Anesthesia Plan    History of general anesthesia?: yes  History of complications of general anesthesia?: no    ASA 2     epidural     Anesthetic plan and risks discussed with patient.

## 2025-07-24 NOTE — LACTATION NOTE
"This note was copied from a baby's chart.  Lactation Consultant Note  Lactation Consultation  Reason for Consult: Initial assessment  Consultant Name: Jose A Roa    Maternal Information  Has mother  before?: Yes  How long did the mother previously breastfeed?: \"a few weeks\"  Infant to breast within first 2 hours of birth?: Yes  Exclusive Pump and Bottle Feed: No    Maternal Assessment  Breast Assessment: Large, Soft, Symmetrical, Compressible, Warm  Nipple Assessment: Erect, Intact, Rounded after feeding  Alterations in Nipple Condition:  (denies)  [place] Assessment: Normal    Infant Assessment  Infant Behavior: Suckles on and off, needs stimulation, Sleepy, Content after feeding  Infant Assessment:  (Full term infant, approximately 2 HOL, adequate voids and stools since delivery)    Feeding Assessment  Nutrition Source: Breastmilk  Feeding Method: Nursing at the breast  Feeding Position: Cradle, Cross - cradle, Skin to skin, Both sides, Mother needs assistance with latch/positioning  Suck/Feeding: Sustained, Tactile stimulation needed, Content after feeding  Latch Assessment: Instructed on deep latch, Latch achieved, Comfortable with no pain, Comfortable latch, Moderate assistance is needed    LATCH TOOL  Latch: Repeated attempts, hold nipple in mouth, stimulate to suck  Audible Swallowing: None  Type of Nipple: Everted (After stimulation)  Comfort (Breast/Nipple): Soft/non-tender  Hold (Positioning): Minimal assist, teach one side, mother does other, staff holds  LATCH Score: 6    Patient Follow-up  Inpatient Lactation Follow-up Needed : Yes    Recommendations/Summary  29 year old  breastfeeding mother. Met with parents for initial lactation consult to assess breastfeeding goals, to address any questions and/or concerns and to offer lactation assistance, support and education as needed and desired. Reports breastfeeding her second child for, \"a few weeks\". Verbalizes goal of breastfeeding this infant " for a few weeks as well. Mother reports that infant latched well for the first feeding and she is attempting to latch her for the second feeding. Reviewed and demonstrated rousing techniques. Infant achieved deep latch on both breasts but was sleep throughout feeding despite rousing techniques. Due to infant being sleepy for the feed, LC encouraged mother to latch infant in one to two hours. Mother verbalizes understanding. All questions and concerns addressed.     Breastfeeding education and support provided throughout consult. Parents provided with the opportunity to ask questions. All questions answered. See education flow sheet for detailed list of education topics covered. Reviewed importance of frequent skin to skin contact, waking techniques, infant stomach capacity, value of colostrum feeds and typical  feeding behaviors in the first 24 hours. Encouraged frequent skin to skin and nursing with cues and at least 8 times in the first 24 hours. Reviewed signs of adequate intake/output. Parents deny any further questions or concerns at this time. Offered ongoing breastfeeding assistance, support and education as needed and desired.

## 2025-07-24 NOTE — H&P
OB Admission H&P    Assessment/Plan    Janna Alvarado is a 29 y.o.  at 39w4d, PASCALE: 2025, by Last Menstrual Period, who presents for Induction of Labor.    Plan  -Admit to L&D, consented  -T&S, CBC, and Syphilis  -Epidural at patient request  -Recheck as clinically indicated by maternal or fetal status  -Plan to initiate induction with pitocin    Fetal Status  -NST reactive, reassuring   -Presentation vertex   based on vaginal exam  -EFW 7lbs by ultrasound  -GBS neg    Postpartum  Contraception Plan:     Pregnancy Problems (from 25 to present)       Problem Noted Diagnosed Resolved    Rh negative state in antepartum period ([facility]) 2025 by Daniel Henderson MD  No    Priority:  Medium       Overview Addendum 7/15/2025 11:37 AM by Daniel Henderson MD   [x]  Baseline antibody screen  [x]  Second trimester antibody screen  [x]  Rhogam given at 28 weeks 25           Insufficient prenatal care in third trimester ([facility]) 2025 by Daniel Henderson MD  No    Priority:  Medium       Overview Signed 2025  1:31 PM by Daniel Henderson MD   -late presentation to care         38 weeks gestation of pregnancy ([facility]) 3/17/2025 by Daniel Henderson MD  No    Priority:  Medium       Overview Addendum 2025 11:58 AM by Daniel Henderson MD   Healthy, no meds  6yo boy (LTCS, fetal intolerance), 2 yo girl ()  No prior GDM, HDP, or PPH  LMP certain 10/19/2024  Late presentation to care, 21 wks.  Declines level 2 and genetic screening      Desired provider in labor: [] CNM  [] Physician   [x] Either Acceptable  [x] Blood Products: [x] Yes, accepts [] No, needs counseling  [x] Initial BMI: Could not be calculated   [x] Prenatal Labs: [x]ORDERED  [x]REVIEWED  [x] Cervical Cancer Screening up to date due, late presentation to care, will get PP  [x] Rh status: O NEG  [x] Screen for IPV and Substance Use Risk: FORMER tobacco  [x] Genetic Screening (cfDNA):  DECLINED  [x]  First Trimester Anatomy Screen (11-13.6 wks): DECLINED  [x] Baby ASA (initiated): DISCUSSED  [x] Pregnancy dated by:  LMP AND 21 WK US    [x] Anatomy US: (19-20 wks) DECLINED, Level 1: ALIZA, anterior placenta, no previa, 3V, Girl  [x] Federal Sterilization consent signed (if indicated): N/A  [x] 1hr GCT at 24-28wks: 188, [x] 3hr  abnormal  [x] Rhogam (if indicated): NEEDED, rhogam 25  [] Fetal Surveillance (if indicated):  [x] Tdap (27-32 wks, may be given up to 36 wks if initial window missed): discussed, declined    [x] Feeding Intentions: Bottle  [x] Postpartum Birth control method: considering IUD  [x] GBS at 36 - 37 wks: NEG  [x] 39 weeks discussion of IOL vs. Expectant management: Previous spontaneous labor at 39w5d. Planning 40 wk IOL if no spontaneous labor  [x] Mode of delivery ( anticipated ): TOLAC 88.3% MFMU score                   Subjective   Good fetal movement.  Denies vaginal bleeding., Denies contractions., Denies leaking of fluid.      Prenatal Provider [facility]    OB History    Para Term  AB Living   3 2 2 0 0 2   SAB IAB Ectopic Multiple Live Births   0 0 0 0 2      # Outcome Date GA Lbr Braxton/2nd Weight Sex Type Anes PTL Lv   3 Current            2 Term 22 40w0d  3.912 kg F  EPI  EMILY   1 Term 20 39w0d  3.033 kg M CS-LTranv   EMILY      Complications: Fetal Intolerance       Surgical History[1]    Social History     Tobacco Use    Smoking status: Former     Types: Cigarettes    Smokeless tobacco: Never   Substance Use Topics    Alcohol use: Never       Allergies[2]    Prescriptions Prior to Admission[3]  Objective     Last Vitals  Temp Pulse Resp BP MAP O2 Sat                   Blood Pressures    No data found in the last 1 encounters.          Physical Exam  General: NAD, mood appropriate  Cardiopulmonary: warm and well perfused, breathing comfortably on room air  Abdomen: Gravid, non-tender  Extremities: Symmetric  Speculum Exam: deferred  Cervix:   /  /       Chaperone Present: Yes.  Chaperone Name/Title: RN  Examination Chaperoned:      Fetal Monitoring  Baseline: 140 bpm, Variability: moderate,  Accelerations: present and Decelerations: none  Uterine Activity: Irregular contractions  Interpretation: Reactive    Bedside ultrasound: Yes    Labs in chart were reviewed.          Prenatal labs reviewed, not remarkable.             [1]   Past Surgical History:  Procedure Laterality Date    APPENDECTOMY       SECTION, LOW TRANSVERSE     [2] No Known Allergies  [3]   Medications Prior to Admission   Medication Sig Dispense Refill Last Dose/Taking    prenatal vit,calc76/iron/folic (PNV 29-1 ORAL) Take by mouth.

## 2025-07-24 NOTE — L&D DELIVERY NOTE
Vaginal Delivery Note    Patient Name: Janna Alvarado  : 1995  MRN: [id number]  Age: 29 y.o.    /Para:   Gestational Age: 39w5d    Date of Delivery: 2025    Procedure: Normal Spontaneous Vaginal Delivery    Delivery Provider: Daniel Henderson MD    Resident/Fellow/Other Assistant: none    Description of Procedure:  Delivery of viable infant under epidural anesthesia. Delayed clamping was performed. The infant was placed skin to skin. Cord gases were sent.  Cord blood was collected. Placenta delivered intact and fundus was firm    No Laceration identified and repaired.    Additional Procedures:  None    Findings:   Amniotic fluid Clear, Female infant in Vertex Occiput Anterior presentation, APGARS 9 , 9 .  Birth Weight 3.49 kg.    Complications: None    Quantitative Blood Loss:   Delivery QBL: 575 mL (2025 10:32 AM - 2025  1:35 PM)    Blood products:      Uterotonics/Hemostatic Agent: IV Pitocin 30 units    Specimen:   Placenta  Delivered: 2025 10:35 AM  Appearance: Intact  Removal: Spontaneous    Disposition: discarded    Sponge/Instrument/Needle Counts: The sponge, lap and needle counts were correct.    Patient Disposition: Patient recovering on labor and delivery in stable condition.    Erwin Alvarado [[id number]]      Labor Events    Rupture date/time: 2025 0440  Rupture type: Spontaneous  Fluid color: Clear  Fluid odor: None  Labor type: Induced Onset of Labor  Labor allowed to proceed with plans for an attempted vaginal birth?: Yes  Induction: Oxytocin  Induction date/time: 2025  Induction indications: Risk Reducing  Complications: None       Labor Event Times    Labor onset date/time: 2025  Dilation complete date/time: 2025 1020       Labor Length    1st stage: 12h 35m  2nd stage: 0h 12m  3rd stage: 0h 03m       Placenta    Placenta delivery date/time: 2025 10:35  Placenta removal: Spontaneous  Placenta appearance:  Intact  Placenta disposition: discarded       Cord    Vessels: 3 vessels  Complications: None  Delayed cord clamping?: Yes  Gases sent?: No       Lacerations    Episiotomy: None  Perineal laceration: 2nd  Perineal laceration repaired?: Yes  Other lacerations?: No  Repair suture: 3-0 Synthetic Suture       Anesthesia    Method: Epidural       Operative Delivery    Forceps attempted?: No  Vacuum extractor attempted?: No       Shoulder Dystocia    Shoulder dystocia present?: No       [place] Delivery    Birth date/time: 2025 10:32:00  Delivery type: Vaginal, Spontaneous  Complications: None       Resuscitation    Method: None       Apgars    Living status: Living  Apgar Component Scores:  1 min.:  5 min.:  10 min.:  15 min.:  20 min.:    Skin color:  1  1       Heart rate:  2  2       Reflex irritability:  2  2       Muscle tone:  2  2       Respiratory effort:  2  2       Total:  9  9       Apgars assigned by: TAINA       Delivery Providers    Delivering clinician: Daniel Henderson MD   Provider Role    Stacy Reyes, RN Delivery Nurse    Jennifer Denton RN Nursery Nurse     Resident

## 2025-07-24 NOTE — CARE PLAN
Problem: Vaginal Birth or  Section  Goal: Fetal and maternal status remain reassuring during the birth process  Outcome: Progressing  Goal: Demonstrates labor coping techniques through delivery  Outcome: Progressing  Goal: Minimal s/sx of HDP and BP<160/110  Outcome: Progressing     Problem: Pain - Adult  Goal: Verbalizes/displays adequate comfort level or baseline comfort level  Outcome: Progressing     Problem: Safety - Adult  Goal: Free from fall injury  Outcome: Progressing

## 2025-07-25 ENCOUNTER — APPOINTMENT (OUTPATIENT)
Dept: RADIOLOGY | Facility: HOSPITAL | Age: 30
End: 2025-07-25
Payer: COMMERCIAL

## 2025-07-25 VITALS
DIASTOLIC BLOOD PRESSURE: 72 MMHG | SYSTOLIC BLOOD PRESSURE: 117 MMHG | HEART RATE: 67 BPM | TEMPERATURE: 99.1 F | RESPIRATION RATE: 16 BRPM | BODY MASS INDEX: 39.48 KG/M2 | WEIGHT: 231.26 LBS | OXYGEN SATURATION: 98 % | HEIGHT: 64 IN

## 2025-07-25 LAB — FETAL MATERNAL SCREEN: NORMAL

## 2025-07-25 PROCEDURE — 85461 HEMOGLOBIN FETAL: CPT

## 2025-07-25 PROCEDURE — 2500000004 HC RX 250 GENERAL PHARMACY W/ HCPCS (ALT 636 FOR OP/ED): Performed by: OBSTETRICS & GYNECOLOGY

## 2025-07-25 PROCEDURE — 93970 EXTREMITY STUDY: CPT

## 2025-07-25 PROCEDURE — 36415 COLL VENOUS BLD VENIPUNCTURE: CPT | Performed by: OBSTETRICS & GYNECOLOGY

## 2025-07-25 PROCEDURE — 2500000001 HC RX 250 WO HCPCS SELF ADMINISTERED DRUGS (ALT 637 FOR MEDICARE OP): Performed by: OBSTETRICS & GYNECOLOGY

## 2025-07-25 RX ORDER — ASPIRIN 81 MG/1
81 TABLET ORAL DAILY
Start: 2025-07-25

## 2025-07-25 RX ORDER — IBUPROFEN 600 MG/1
600 TABLET, FILM COATED ORAL EVERY 6 HOURS
Qty: 30 TABLET | Refills: 0 | Status: CANCELLED | OUTPATIENT
Start: 2025-07-25

## 2025-07-25 RX ORDER — ACETAMINOPHEN 325 MG/1
975 TABLET ORAL EVERY 6 HOURS
Start: 2025-07-25

## 2025-07-25 RX ORDER — IBUPROFEN 600 MG/1
600 TABLET, FILM COATED ORAL EVERY 6 HOURS
Qty: 30 TABLET | Refills: 0 | Status: SHIPPED | OUTPATIENT
Start: 2025-07-25

## 2025-07-25 RX ORDER — ACETAMINOPHEN 325 MG/1
975 TABLET ORAL EVERY 6 HOURS
Status: CANCELLED
Start: 2025-07-25

## 2025-07-25 RX ADMIN — RHO(D) IMMUNE GLOBULIN (HUMAN) 300 MCG: 1500 SOLUTION INTRAMUSCULAR at 09:59

## 2025-07-25 RX ADMIN — IBUPROFEN 600 MG: 600 TABLET ORAL at 07:44

## 2025-07-25 RX ADMIN — ENOXAPARIN SODIUM 40 MG: 100 INJECTION SUBCUTANEOUS at 07:44

## 2025-07-25 RX ADMIN — ACETAMINOPHEN 975 MG: 325 TABLET ORAL at 01:02

## 2025-07-25 RX ADMIN — ACETAMINOPHEN 975 MG: 325 TABLET ORAL at 07:44

## 2025-07-25 RX ADMIN — IBUPROFEN 600 MG: 600 TABLET ORAL at 01:02

## 2025-07-25 ASSESSMENT — PAIN DESCRIPTION - DESCRIPTORS
DESCRIPTORS: CRAMPING
DESCRIPTORS: CRAMPING

## 2025-07-25 ASSESSMENT — PAIN SCALES - GENERAL
PAINLEVEL_OUTOF10: 4
PAINLEVEL_OUTOF10: 4

## 2025-07-25 NOTE — LACTATION NOTE
Lactation Consultant Note   breast and bottle feeding mother whose infant is at approximately 24 HOL. Patient has previous breastfeeding experience of 1 week per her choice.     1130 -  provided discharge education at this time. Breastfeeding Step by Step was reviewed and topics covered include, but are not limited to, normal  feeding and diaper patterns over the first week of life, engorgement, clogged ducts / mastitis, soreness, and safe milk storage guidelines. Ongoing lactation support offered; patient declines any further needs or concerns at this time, verbalizes understanding of education provided, and agrees with plan for discharge today.

## 2025-07-25 NOTE — CARE PLAN
The patient's goals for the shift include discharge to home with infant    The clinical goals for the shift include completion of homegoing education; discharge to home with infant    Over the shift, the patient did not make progress toward the following goals: none. Barriers to progression include none. Recommendations to address these barriers include n/a.

## 2025-07-27 ENCOUNTER — HOSPITAL ENCOUNTER (OUTPATIENT)
Facility: HOSPITAL | Age: 30
Discharge: HOME | End: 2025-07-28
Attending: OBSTETRICS & GYNECOLOGY | Admitting: OBSTETRICS & GYNECOLOGY
Payer: COMMERCIAL

## 2025-07-27 ENCOUNTER — HOSPITAL ENCOUNTER (EMERGENCY)
Facility: HOSPITAL | Age: 30
Discharge: STILL ADMITTED OR ADMINISTRATIVE | End: 2025-07-27
Payer: COMMERCIAL

## 2025-07-27 LAB
APPEARANCE UR: CLEAR
BACTERIA #/AREA URNS AUTO: ABNORMAL /HPF
BILIRUB UR STRIP.AUTO-MCNC: NEGATIVE MG/DL
COLOR UR: COLORLESS
ERYTHROCYTE [DISTWIDTH] IN BLOOD BY AUTOMATED COUNT: 14.1 % (ref 11.5–14.5)
GLUCOSE UR STRIP.AUTO-MCNC: NORMAL MG/DL
HCT VFR BLD AUTO: 34.7 % (ref 36–46)
HGB BLD-MCNC: 11.5 G/DL (ref 12–16)
KETONES UR STRIP.AUTO-MCNC: NEGATIVE MG/DL
LEUKOCYTE ESTERASE UR QL STRIP.AUTO: ABNORMAL
MCH RBC QN AUTO: 31.1 PG (ref 26–34)
MCHC RBC AUTO-ENTMCNC: 33.1 G/DL (ref 32–36)
MCV RBC AUTO: 94 FL (ref 80–100)
NITRITE UR QL STRIP.AUTO: NEGATIVE
NRBC BLD-RTO: 0 /100 WBCS (ref 0–0)
PH UR STRIP.AUTO: 6.5 [PH]
PLATELET # BLD AUTO: 235 X10*3/UL (ref 150–450)
PROT UR STRIP.AUTO-MCNC: NEGATIVE MG/DL
RBC # BLD AUTO: 3.7 X10*6/UL (ref 4–5.2)
RBC # UR STRIP.AUTO: ABNORMAL MG/DL
RBC #/AREA URNS AUTO: ABNORMAL /HPF
SP GR UR STRIP.AUTO: 1.01
SQUAMOUS #/AREA URNS AUTO: ABNORMAL /HPF
UROBILINOGEN UR STRIP.AUTO-MCNC: NORMAL MG/DL
WBC # BLD AUTO: 9.2 X10*3/UL (ref 4.4–11.3)
WBC #/AREA URNS AUTO: ABNORMAL /HPF

## 2025-07-27 PROCEDURE — 4500999001 HC ED NO CHARGE

## 2025-07-27 PROCEDURE — G0378 HOSPITAL OBSERVATION PER HR: HCPCS

## 2025-07-27 PROCEDURE — 80053 COMPREHEN METABOLIC PANEL: CPT | Performed by: OBSTETRICS & GYNECOLOGY

## 2025-07-27 PROCEDURE — 85027 COMPLETE CBC AUTOMATED: CPT | Performed by: OBSTETRICS & GYNECOLOGY

## 2025-07-27 PROCEDURE — 0503F POSTPARTUM CARE VISIT: CPT | Performed by: OBSTETRICS & GYNECOLOGY

## 2025-07-27 PROCEDURE — 81001 URINALYSIS AUTO W/SCOPE: CPT | Performed by: OBSTETRICS & GYNECOLOGY

## 2025-07-27 PROCEDURE — 36415 COLL VENOUS BLD VENIPUNCTURE: CPT | Performed by: OBSTETRICS & GYNECOLOGY

## 2025-07-27 PROCEDURE — 2500000004 HC RX 250 GENERAL PHARMACY W/ HCPCS (ALT 636 FOR OP/ED): Performed by: OBSTETRICS & GYNECOLOGY

## 2025-07-27 RX ORDER — METOCLOPRAMIDE HYDROCHLORIDE 5 MG/ML
10 INJECTION INTRAMUSCULAR; INTRAVENOUS ONCE
Status: COMPLETED | OUTPATIENT
Start: 2025-07-27 | End: 2025-07-27

## 2025-07-27 RX ORDER — DIPHENHYDRAMINE HYDROCHLORIDE 50 MG/ML
25 INJECTION, SOLUTION INTRAMUSCULAR; INTRAVENOUS ONCE
Status: COMPLETED | OUTPATIENT
Start: 2025-07-27 | End: 2025-07-27

## 2025-07-27 RX ADMIN — SODIUM CHLORIDE, SODIUM LACTATE, POTASSIUM CHLORIDE, AND CALCIUM CHLORIDE 1000 ML: .6; .31; .03; .02 INJECTION, SOLUTION INTRAVENOUS at 23:46

## 2025-07-27 RX ADMIN — DIPHENHYDRAMINE HYDROCHLORIDE 25 MG: 50 INJECTION, SOLUTION INTRAMUSCULAR; INTRAVENOUS at 23:47

## 2025-07-27 RX ADMIN — METOCLOPRAMIDE 10 MG: 5 INJECTION, SOLUTION INTRAMUSCULAR; INTRAVENOUS at 23:47

## 2025-07-27 SDOH — SOCIAL STABILITY: SOCIAL INSECURITY: ARE YOU OR HAVE YOU BEEN THREATENED OR ABUSED PHYSICALLY, EMOTIONALLY, OR SEXUALLY BY ANYONE?: NO

## 2025-07-27 SDOH — ECONOMIC STABILITY: FOOD INSECURITY: WITHIN THE PAST 12 MONTHS, YOU WORRIED THAT YOUR FOOD WOULD RUN OUT BEFORE YOU GOT THE MONEY TO BUY MORE.: NEVER TRUE

## 2025-07-27 SDOH — SOCIAL STABILITY: SOCIAL INSECURITY: WITHIN THE LAST YEAR, HAVE YOU BEEN HUMILIATED OR EMOTIONALLY ABUSED IN OTHER WAYS BY YOUR PARTNER OR EX-PARTNER?: NO

## 2025-07-27 SDOH — SOCIAL STABILITY: SOCIAL INSECURITY: DOES ANYONE TRY TO KEEP YOU FROM HAVING/CONTACTING OTHER FRIENDS OR DOING THINGS OUTSIDE YOUR HOME?: NO

## 2025-07-27 SDOH — SOCIAL STABILITY: SOCIAL INSECURITY: WITHIN THE LAST YEAR, HAVE YOU BEEN AFRAID OF YOUR PARTNER OR EX-PARTNER?: NO

## 2025-07-27 SDOH — SOCIAL STABILITY: SOCIAL INSECURITY: HAVE YOU HAD THOUGHTS OF HARMING ANYONE ELSE?: NO

## 2025-07-27 SDOH — SOCIAL STABILITY: SOCIAL INSECURITY: WERE YOU ABLE TO COMPLETE ALL THE BEHAVIORAL HEALTH SCREENINGS?: YES

## 2025-07-27 SDOH — SOCIAL STABILITY: SOCIAL INSECURITY: ABUSE: ADULT

## 2025-07-27 SDOH — ECONOMIC STABILITY: FOOD INSECURITY: WITHIN THE PAST 12 MONTHS, THE FOOD YOU BOUGHT JUST DIDN'T LAST AND YOU DIDN'T HAVE MONEY TO GET MORE.: NEVER TRUE

## 2025-07-27 SDOH — SOCIAL STABILITY: SOCIAL INSECURITY: ARE THERE ANY APPARENT SIGNS OF INJURIES/BEHAVIORS THAT COULD BE RELATED TO ABUSE/NEGLECT?: NO

## 2025-07-27 SDOH — SOCIAL STABILITY: SOCIAL INSECURITY: DO YOU FEEL UNSAFE GOING BACK TO THE PLACE WHERE YOU ARE LIVING?: NO

## 2025-07-27 SDOH — SOCIAL STABILITY: SOCIAL INSECURITY: DO YOU FEEL ANYONE HAS EXPLOITED OR TAKEN ADVANTAGE OF YOU FINANCIALLY OR OF YOUR PERSONAL PROPERTY?: NO

## 2025-07-27 SDOH — SOCIAL STABILITY: SOCIAL INSECURITY: HAS ANYONE EVER THREATENED TO HURT YOUR FAMILY OR YOUR PETS?: NO

## 2025-07-27 SDOH — SOCIAL STABILITY: SOCIAL INSECURITY: HAVE YOU HAD ANY THOUGHTS OF HARMING ANYONE ELSE?: NO

## 2025-07-27 ASSESSMENT — LIFESTYLE VARIABLES
AUDIT-C TOTAL SCORE: 0
HOW OFTEN DO YOU HAVE A DRINK CONTAINING ALCOHOL: NEVER
HOW MANY STANDARD DRINKS CONTAINING ALCOHOL DO YOU HAVE ON A TYPICAL DAY: PATIENT DOES NOT DRINK
AUDIT-C TOTAL SCORE: 0
HOW OFTEN DO YOU HAVE 6 OR MORE DRINKS ON ONE OCCASION: NEVER
SKIP TO QUESTIONS 9-10: 1

## 2025-07-27 ASSESSMENT — ACTIVITIES OF DAILY LIVING (ADL)
FEEDING YOURSELF: INDEPENDENT
TOILETING: INDEPENDENT
ADEQUATE_TO_COMPLETE_ADL: YES
JUDGMENT_ADEQUATE_SAFELY_COMPLETE_DAILY_ACTIVITIES: YES
HEARING - LEFT EAR: FUNCTIONAL
LACK_OF_TRANSPORTATION: NO
GROOMING: INDEPENDENT
HEARING - RIGHT EAR: FUNCTIONAL
WALKS IN HOME: INDEPENDENT
PATIENT'S MEMORY ADEQUATE TO SAFELY COMPLETE DAILY ACTIVITIES?: YES
DRESSING YOURSELF: INDEPENDENT
BATHING: INDEPENDENT

## 2025-07-27 ASSESSMENT — COGNITIVE AND FUNCTIONAL STATUS - GENERAL
MOBILITY SCORE: 24
DAILY ACTIVITIY SCORE: 24
PATIENT BASELINE BEDBOUND: NO

## 2025-07-27 ASSESSMENT — PAIN DESCRIPTION - DESCRIPTORS
DESCRIPTORS: HEADACHE
DESCRIPTORS: HEADACHE

## 2025-07-27 ASSESSMENT — PAIN SCALES - GENERAL
PAINLEVEL_OUTOF10: 7
PAINLEVEL_OUTOF10: 5 - MODERATE PAIN

## 2025-07-28 VITALS
HEART RATE: 43 BPM | SYSTOLIC BLOOD PRESSURE: 131 MMHG | DIASTOLIC BLOOD PRESSURE: 77 MMHG | RESPIRATION RATE: 16 BRPM | TEMPERATURE: 98.4 F | OXYGEN SATURATION: 98 %

## 2025-07-28 LAB
ALBUMIN SERPL BCP-MCNC: 3.4 G/DL (ref 3.4–5)
ALP SERPL-CCNC: 97 U/L (ref 33–110)
ALT SERPL W P-5'-P-CCNC: 12 U/L (ref 7–45)
ANION GAP SERPL CALC-SCNC: 14 MMOL/L (ref 10–20)
AST SERPL W P-5'-P-CCNC: 15 U/L (ref 9–39)
BILIRUB SERPL-MCNC: 0.3 MG/DL (ref 0–1.2)
BUN SERPL-MCNC: 10 MG/DL (ref 6–23)
CALCIUM SERPL-MCNC: 8.8 MG/DL (ref 8.6–10.3)
CHLORIDE SERPL-SCNC: 103 MMOL/L (ref 98–107)
CO2 SERPL-SCNC: 24 MMOL/L (ref 21–32)
CREAT SERPL-MCNC: 0.65 MG/DL (ref 0.5–1.05)
EGFRCR SERPLBLD CKD-EPI 2021: >90 ML/MIN/1.73M*2
GLUCOSE SERPL-MCNC: 92 MG/DL (ref 74–99)
POTASSIUM SERPL-SCNC: 3.8 MMOL/L (ref 3.5–5.3)
PROT SERPL-MCNC: 6.4 G/DL (ref 6.4–8.2)
SODIUM SERPL-SCNC: 137 MMOL/L (ref 136–145)

## 2025-07-28 PROCEDURE — G0378 HOSPITAL OBSERVATION PER HR: HCPCS

## 2025-07-28 PROCEDURE — 36415 COLL VENOUS BLD VENIPUNCTURE: CPT

## 2025-07-28 PROCEDURE — 99214 OFFICE O/P EST MOD 30 MIN: CPT

## 2025-07-28 NOTE — H&P
OB Triage H&P    Assessment/Plan    Janna Alvarado is a 29 y.o.  at 39w5d, PASCALE: 2025, by Last Menstrual Period, who presents to triage with passage of a blood clot (about 3x1-1.5 cm based on my interpretation of the patient's visual demonstration of the size of the clot).    HA -  possible post dural puncture HA vs. Other benign HA. Do not suspect preeclampsia.  PPD#3 - S/P       Plan    - Reglan and Benadryl for HA.  HA decreased to a 4-5 level post IVFs, Reglan and Benadryl.  - LR 1000 mL IVF bolus  - CBC, CMP, U/A  - U/A result consistent with likely contamination from lochia. Do not suspect UTI. No dysuria noted. No proteinuria noted.        Dispo    Stable for discharge with precautions. Return precautions including follow up prn for persistent / recurrent HA, preeclampsia precautions and follow up prn for dysuria were reviewed with the patient.  I also discussed with her drinking some drinks with caffeine in case her HA is a spinal (post spinal puncture) HA and staying hydrated. Would consider consultation with anesthesia if she has recurrent / persistent HAs     Pregnancy Problems (from 25 to present)       Problem Noted Diagnosed Resolved    Term pregnancy ([facility]) 2025 by Daniel Henderson MD  2025 by Daniel Henderson MD    Priority:  Medium       Rh negative state in antepartum period ([facility]) 2025 by Daniel Henderson MD  2025 by Daniel Henderson MD    Priority:  Medium       Overview Addendum 7/15/2025 11:37 AM by Daniel Henderson MD   [x]  Baseline antibody screen  [x]  Second trimester antibody screen  [x]  Rhogam given at 28 weeks 25           Insufficient prenatal care in third trimester ([facility]) 2025 by Daniel Henderson MD  2025 by Daniel Henderson MD    Priority:  Medium       Overview Signed 2025  1:31 PM by Daniel Henderson MD   -late presentation to care         38 weeks gestation of pregnancy  ([facility]) 3/17/2025 by Daniel Henderson MD  2025 by Daniel Henderson MD    Priority:  Medium       Overview Addendum 2025 11:58 AM by Daniel Henderson MD   Healthy, no meds  6yo boy (LTCS, fetal intolerance), 4 yo girl ()  No prior GDM, HDP, or PPH  LMP certain 10/19/2024  Late presentation to care, 21 wks.  Declines level 2 and genetic screening      Desired provider in labor: [] CNM  [] Physician   [x] Either Acceptable  [x] Blood Products: [x] Yes, accepts [] No, needs counseling  [x] Initial BMI: Could not be calculated   [x] Prenatal Labs: [x]ORDERED  [x]REVIEWED  [x] Cervical Cancer Screening up to date due, late presentation to care, will get PP  [x] Rh status: O NEG  [x] Screen for IPV and Substance Use Risk: FORMER tobacco  [x] Genetic Screening (cfDNA):  DECLINED  [x] First Trimester Anatomy Screen (11-13.6 wks): DECLINED  [x] Baby ASA (initiated): DISCUSSED  [x] Pregnancy dated by:  LMP AND 21 WK US    [x] Anatomy US: (19-20 wks) DECLINED, Level 1: ALIZA, anterior placenta, no previa, 3V, Girl  [x] Federal Sterilization consent signed (if indicated): N/A  [x] 1hr GCT at 24-28wks: 188, [x] 3hr 1/4 abnormal  [x] Rhogam (if indicated): NEEDED, rhogam 25  [] Fetal Surveillance (if indicated):  [x] Tdap (27-32 wks, may be given up to 36 wks if initial window missed): discussed, declined    [x] Feeding Intentions: Bottle  [x] Postpartum Birth control method: considering IUD  [x] GBS at 36 - 37 wks: NEG  [x] 39 weeks discussion of IOL vs. Expectant management: Previous spontaneous labor at 39w5d. Planning 40 wk IOL if no spontaneous labor  [x] Mode of delivery ( anticipated ): TOLAC 88.3% MFMU score                   Subjective       She passed a blood clot without tissue at around 8 pm. Then she had a significant HA 8/10. The HA was frontal and the went around to the back of her head. No vision changes, stiff neck, congestion, weakness or numbness or other HA related symptoms.  Patient was at a level of 8/10 with my interview of the patient. It had been a little worse before. She took three 500 mg Tylenols because it was bad. I cautioned the patient not to take that much Tylenol at once that it could hurt her liver. She later also too ibuprofen 600 mg. She has noted the HA was better with lying down. She did have an epidural. After I assessed the patient she noted her HA had come down to a level of 5.    No SOB, upper abdominal pain or nausea. Some swelling of the lower extremities.    No other problems noted.   Prenatal Provider Dr. Henderson    OB History    Para Term  AB Living   3 3 3 0 0 3   SAB IAB Ectopic Multiple Live Births   0 0 0 0 3      # Outcome Date GA Lbr Braxton/2nd Weight Sex Type Anes PTL Lv   3 Term 25 39w5d 12:35 / 00:12 3.49 kg F Vag-Spont EPI  EMILY      Name: Erwin Alvarado      Apgar1: 9  Apgar5: 9   2 Term 22 40w0d  3.912 kg F  EPI  EMILY   1 Term 20 39w0d  3.033 kg M CS-LTranv   EMILY      Complications: Fetal Intolerance       Surgical History[1]    Social History     Tobacco Use    Smoking status: Every Day     Current packs/day: 0.25     Types: Cigarettes    Smokeless tobacco: Never   Substance Use Topics    Alcohol use: Never       Allergies[2]    Prescriptions Prior to Admission[3]  Objective     Last Vitals  Temp Pulse Resp BP MAP O2 Sat   36.9 °C (98.4 °F) (!) 43 16 131/77 98 98 %     Blood Pressures         2025  2225 2025  0016          BP: 130/73 131/77               Physical Exam  General: She does not appear to be feeling well. Normal affect  Neuro - alert. Patellar DTRs - normal  HEENT- normocephalic, nontender. Somewhat flushed facial appearance  Neck - supple  Cardiopulmonary: Heart- RRR, no murmur. Lungs - CTA  Abdomen: soft, uterine fundus - below the umbilicus, mild tenderness across the bilateral lower abdomen   Back - mild bilateral lumbar tenderness. No abnormalities noted at epidural sit  Extremities:  Calves- no tenderness noted bilaterally. Moderate pedal / pretibial edema bilaterally  Speculum Exam: Small somewhat dark blood in the vagina.. No active bleeding. No POC. No gross pathologic discharge  Bimanual exam - uterine fundus below the umbilicus, uterine size appropriate for early postpartum state. Mild central pelvic / uterine tenderness. No adnexal masses or tenderness  Pelvic US - limited bedside with an abdominal probe: Define endometrial stripe. No evidence of retained POC.    Chaperone Present: Yes.  Chaperone Name/Title: Nurse  Examination Chaperoned: Genitourinary Exam       Labs in chart were reviewed.   Results from last 7 days   Lab Units 25  2335 25  2238 25  2118   WBC AUTO x10*3/uL  --  9.2 10.7   HEMOGLOBIN g/dL  --  11.5* 12.3   HEMATOCRIT %  --  34.7* 34.7*   PLATELETS AUTO x10*3/uL  --  235 245   AST U/L 15  --   --    ALT U/L 12  --   --    CREATININE mg/dL 0.65  --   --              [1]   Past Surgical History:  Procedure Laterality Date    APPENDECTOMY       SECTION, LOW TRANSVERSE     [2] No Known Allergies  [3]   Medications Prior to Admission   Medication Sig Dispense Refill Last Dose/Taking    acetaminophen (Tylenol) 325 mg tablet Take 3 tablets (975 mg) by mouth every 6 hours. Discontinue when no longer having postpartum pain.   2025    aspirin 81 mg EC tablet Take 1 tablet (81 mg) by mouth once daily.   Past Week    ibuprofen 600 mg tablet Take 1 tablet (600 mg) by mouth every 6 hours. Discontinue when no longer having postpartum pain. 30 tablet 0 2025    prenatal vit,calc76/iron/folic (PNV 29-1 ORAL) Take by mouth.   2025 Morning

## 2025-08-27 ENCOUNTER — APPOINTMENT (OUTPATIENT)
Facility: CLINIC | Age: 30
End: 2025-08-27
Payer: COMMERCIAL

## 2025-09-02 ENCOUNTER — APPOINTMENT (OUTPATIENT)
Facility: CLINIC | Age: 30
End: 2025-09-02
Payer: COMMERCIAL

## 2025-09-02 ASSESSMENT — EDINBURGH POSTNATAL DEPRESSION SCALE (EPDS)
I HAVE BEEN ANXIOUS OR WORRIED FOR NO GOOD REASON: NO, NOT AT ALL
THINGS HAVE BEEN GETTING ON TOP OF ME: NO, I HAVE BEEN COPING AS WELL AS EVER
TOTAL SCORE: 0
I HAVE LOOKED FORWARD WITH ENJOYMENT TO THINGS: AS MUCH AS I EVER DID
I HAVE BEEN SO UNHAPPY THAT I HAVE BEEN CRYING: NO, NEVER
I HAVE BLAMED MYSELF UNNECESSARILY WHEN THINGS WENT WRONG: NO, NEVER
THE THOUGHT OF HARMING MYSELF HAS OCCURRED TO ME: NEVER
I HAVE BEEN ABLE TO LAUGH AND SEE THE FUNNY SIDE OF THINGS: AS MUCH AS I ALWAYS COULD
I HAVE FELT SCARED OR PANICKY FOR NO GOOD REASON: NO, NOT AT ALL
I HAVE FELT SAD OR MISERABLE: NO, NOT AT ALL
I HAVE BEEN SO UNHAPPY THAT I HAVE HAD DIFFICULTY SLEEPING: NOT AT ALL

## 2025-09-02 ASSESSMENT — COLUMBIA-SUICIDE SEVERITY RATING SCALE - C-SSRS
1. IN THE PAST MONTH, HAVE YOU WISHED YOU WERE DEAD OR WISHED YOU COULD GO TO SLEEP AND NOT WAKE UP?: NO
2. HAVE YOU ACTUALLY HAD ANY THOUGHTS OF KILLING YOURSELF?: NO
6. HAVE YOU EVER DONE ANYTHING, STARTED TO DO ANYTHING, OR PREPARED TO DO ANYTHING TO END YOUR LIFE?: NO

## 2025-09-02 ASSESSMENT — PATIENT HEALTH QUESTIONNAIRE - PHQ9
2. FEELING DOWN, DEPRESSED OR HOPELESS: NOT AT ALL
SUM OF ALL RESPONSES TO PHQ9 QUESTIONS 1 AND 2: 0
1. LITTLE INTEREST OR PLEASURE IN DOING THINGS: NOT AT ALL